# Patient Record
Sex: FEMALE | Race: OTHER | HISPANIC OR LATINO | URBAN - METROPOLITAN AREA
[De-identification: names, ages, dates, MRNs, and addresses within clinical notes are randomized per-mention and may not be internally consistent; named-entity substitution may affect disease eponyms.]

---

## 2022-04-14 ENCOUNTER — INPATIENT (INPATIENT)
Facility: HOSPITAL | Age: 62
LOS: 1 days | Discharge: HOME HEALTH | End: 2022-04-16
Attending: HOSPITALIST | Admitting: HOSPITALIST
Payer: MEDICAID

## 2022-04-14 VITALS
TEMPERATURE: 99 F | OXYGEN SATURATION: 98 % | SYSTOLIC BLOOD PRESSURE: 183 MMHG | WEIGHT: 130.07 LBS | DIASTOLIC BLOOD PRESSURE: 88 MMHG | HEART RATE: 69 BPM | RESPIRATION RATE: 18 BRPM

## 2022-04-14 LAB
ALBUMIN SERPL ELPH-MCNC: 3.6 G/DL — SIGNIFICANT CHANGE UP (ref 3.5–5.2)
ALP SERPL-CCNC: 87 U/L — SIGNIFICANT CHANGE UP (ref 30–115)
ALT FLD-CCNC: 8 U/L — SIGNIFICANT CHANGE UP (ref 0–41)
ANION GAP SERPL CALC-SCNC: 12 MMOL/L — SIGNIFICANT CHANGE UP (ref 7–14)
ANION GAP SERPL CALC-SCNC: 13 MMOL/L — SIGNIFICANT CHANGE UP (ref 7–14)
APPEARANCE UR: CLEAR — SIGNIFICANT CHANGE UP
AST SERPL-CCNC: 15 U/L — SIGNIFICANT CHANGE UP (ref 0–41)
BACTERIA # UR AUTO: NEGATIVE — SIGNIFICANT CHANGE UP
BASE EXCESS BLDV CALC-SCNC: -6.8 MMOL/L — LOW (ref -2–3)
BASOPHILS # BLD AUTO: 0.03 K/UL — SIGNIFICANT CHANGE UP (ref 0–0.2)
BASOPHILS NFR BLD AUTO: 0.3 % — SIGNIFICANT CHANGE UP (ref 0–1)
BILIRUB SERPL-MCNC: <0.2 MG/DL — SIGNIFICANT CHANGE UP (ref 0.2–1.2)
BILIRUB UR-MCNC: NEGATIVE — SIGNIFICANT CHANGE UP
BUN SERPL-MCNC: 42 MG/DL — HIGH (ref 10–20)
BUN SERPL-MCNC: 43 MG/DL — HIGH (ref 10–20)
CA-I SERPL-SCNC: 1.19 MMOL/L — SIGNIFICANT CHANGE UP (ref 1.15–1.33)
CALCIUM SERPL-MCNC: 8.5 MG/DL — SIGNIFICANT CHANGE UP (ref 8.5–10.1)
CALCIUM SERPL-MCNC: 8.8 MG/DL — SIGNIFICANT CHANGE UP (ref 8.5–10.1)
CHLORIDE SERPL-SCNC: 105 MMOL/L — SIGNIFICANT CHANGE UP (ref 98–110)
CHLORIDE SERPL-SCNC: 107 MMOL/L — SIGNIFICANT CHANGE UP (ref 98–110)
CO2 SERPL-SCNC: 18 MMOL/L — SIGNIFICANT CHANGE UP (ref 17–32)
CO2 SERPL-SCNC: 19 MMOL/L — SIGNIFICANT CHANGE UP (ref 17–32)
COLOR SPEC: COLORLESS — SIGNIFICANT CHANGE UP
CREAT SERPL-MCNC: 2.5 MG/DL — HIGH (ref 0.7–1.5)
CREAT SERPL-MCNC: 2.7 MG/DL — HIGH (ref 0.7–1.5)
DIFF PNL FLD: ABNORMAL
EGFR: 19 ML/MIN/1.73M2 — LOW
EGFR: 21 ML/MIN/1.73M2 — LOW
EOSINOPHIL # BLD AUTO: 0.1 K/UL — SIGNIFICANT CHANGE UP (ref 0–0.7)
EOSINOPHIL NFR BLD AUTO: 1 % — SIGNIFICANT CHANGE UP (ref 0–8)
EPI CELLS # UR: 2 /HPF — SIGNIFICANT CHANGE UP (ref 0–5)
GAS PNL BLDV: 133 MMOL/L — LOW (ref 136–145)
GAS PNL BLDV: SIGNIFICANT CHANGE UP
GLUCOSE SERPL-MCNC: 115 MG/DL — HIGH (ref 70–99)
GLUCOSE SERPL-MCNC: 140 MG/DL — HIGH (ref 70–99)
GLUCOSE UR QL: NEGATIVE — SIGNIFICANT CHANGE UP
HCO3 BLDV-SCNC: 20 MMOL/L — LOW (ref 22–29)
HCT VFR BLD CALC: 29 % — LOW (ref 37–47)
HCT VFR BLDA CALC: 30 % — LOW (ref 39–51)
HGB BLD CALC-MCNC: 10.1 G/DL — LOW (ref 12.6–17.4)
HGB BLD-MCNC: 9.8 G/DL — LOW (ref 12–16)
HYALINE CASTS # UR AUTO: 1 /LPF — SIGNIFICANT CHANGE UP (ref 0–7)
IMM GRANULOCYTES NFR BLD AUTO: 0.3 % — SIGNIFICANT CHANGE UP (ref 0.1–0.3)
KETONES UR-MCNC: NEGATIVE — SIGNIFICANT CHANGE UP
LACTATE BLDV-MCNC: 1.5 MMOL/L — SIGNIFICANT CHANGE UP (ref 0.5–2)
LEUKOCYTE ESTERASE UR-ACNC: NEGATIVE — SIGNIFICANT CHANGE UP
LYMPHOCYTES # BLD AUTO: 2.31 K/UL — SIGNIFICANT CHANGE UP (ref 1.2–3.4)
LYMPHOCYTES # BLD AUTO: 23.9 % — SIGNIFICANT CHANGE UP (ref 20.5–51.1)
MCHC RBC-ENTMCNC: 30.4 PG — SIGNIFICANT CHANGE UP (ref 27–31)
MCHC RBC-ENTMCNC: 33.8 G/DL — SIGNIFICANT CHANGE UP (ref 32–37)
MCV RBC AUTO: 90.1 FL — SIGNIFICANT CHANGE UP (ref 81–99)
MONOCYTES # BLD AUTO: 0.49 K/UL — SIGNIFICANT CHANGE UP (ref 0.1–0.6)
MONOCYTES NFR BLD AUTO: 5.1 % — SIGNIFICANT CHANGE UP (ref 1.7–9.3)
NEUTROPHILS # BLD AUTO: 6.7 K/UL — HIGH (ref 1.4–6.5)
NEUTROPHILS NFR BLD AUTO: 69.4 % — SIGNIFICANT CHANGE UP (ref 42.2–75.2)
NITRITE UR-MCNC: NEGATIVE — SIGNIFICANT CHANGE UP
NRBC # BLD: 0 /100 WBCS — SIGNIFICANT CHANGE UP (ref 0–0)
OSMOLALITY UR: 203 MOS/KG — SIGNIFICANT CHANGE UP (ref 50–1200)
PCO2 BLDV: 46 MMHG — HIGH (ref 39–42)
PH BLDV: 7.25 — LOW (ref 7.32–7.43)
PH UR: 6 — SIGNIFICANT CHANGE UP (ref 5–8)
PLATELET # BLD AUTO: 227 K/UL — SIGNIFICANT CHANGE UP (ref 130–400)
PO2 BLDV: 37 MMHG — SIGNIFICANT CHANGE UP
POTASSIUM BLDV-SCNC: 4.3 MMOL/L — SIGNIFICANT CHANGE UP (ref 3.5–5.1)
POTASSIUM SERPL-MCNC: 4.5 MMOL/L — SIGNIFICANT CHANGE UP (ref 3.5–5)
POTASSIUM SERPL-MCNC: 5.9 MMOL/L — HIGH (ref 3.5–5)
POTASSIUM SERPL-SCNC: 4.5 MMOL/L — SIGNIFICANT CHANGE UP (ref 3.5–5)
POTASSIUM SERPL-SCNC: 5.9 MMOL/L — HIGH (ref 3.5–5)
PROT SERPL-MCNC: 5.9 G/DL — LOW (ref 6–8)
PROT UR-MCNC: ABNORMAL
RBC # BLD: 3.22 M/UL — LOW (ref 4.2–5.4)
RBC # FLD: 12.6 % — SIGNIFICANT CHANGE UP (ref 11.5–14.5)
RBC CASTS # UR COMP ASSIST: 3 /HPF — SIGNIFICANT CHANGE UP (ref 0–4)
SAO2 % BLDV: 60.9 % — SIGNIFICANT CHANGE UP
SARS-COV-2 RNA SPEC QL NAA+PROBE: DETECTED
SODIUM SERPL-SCNC: 137 MMOL/L — SIGNIFICANT CHANGE UP (ref 135–146)
SODIUM SERPL-SCNC: 137 MMOL/L — SIGNIFICANT CHANGE UP (ref 135–146)
SODIUM UR-SCNC: 57 MMOL/L — SIGNIFICANT CHANGE UP
SP GR SPEC: 1 — LOW (ref 1.01–1.03)
UROBILINOGEN FLD QL: SIGNIFICANT CHANGE UP
WBC # BLD: 9.66 K/UL — SIGNIFICANT CHANGE UP (ref 4.8–10.8)
WBC # FLD AUTO: 9.66 K/UL — SIGNIFICANT CHANGE UP (ref 4.8–10.8)
WBC UR QL: 2 /HPF — SIGNIFICANT CHANGE UP (ref 0–5)

## 2022-04-14 PROCEDURE — 99223 1ST HOSP IP/OBS HIGH 75: CPT

## 2022-04-14 PROCEDURE — 93010 ELECTROCARDIOGRAM REPORT: CPT

## 2022-04-14 PROCEDURE — 76770 US EXAM ABDO BACK WALL COMP: CPT | Mod: 26

## 2022-04-14 PROCEDURE — 99285 EMERGENCY DEPT VISIT HI MDM: CPT

## 2022-04-14 RX ORDER — NIFEDIPINE 30 MG
30 TABLET, EXTENDED RELEASE 24 HR ORAL ONCE
Refills: 0 | Status: COMPLETED | OUTPATIENT
Start: 2022-04-14 | End: 2022-04-14

## 2022-04-14 RX ORDER — HEPARIN SODIUM 5000 [USP'U]/ML
5000 INJECTION INTRAVENOUS; SUBCUTANEOUS EVERY 12 HOURS
Refills: 0 | Status: DISCONTINUED | OUTPATIENT
Start: 2022-04-14 | End: 2022-04-16

## 2022-04-14 RX ORDER — SODIUM CHLORIDE 9 MG/ML
1000 INJECTION, SOLUTION INTRAVENOUS ONCE
Refills: 0 | Status: DISCONTINUED | OUTPATIENT
Start: 2022-04-14 | End: 2022-04-14

## 2022-04-14 RX ORDER — SODIUM CHLORIDE 9 MG/ML
1000 INJECTION, SOLUTION INTRAVENOUS
Refills: 0 | Status: DISCONTINUED | OUTPATIENT
Start: 2022-04-14 | End: 2022-04-16

## 2022-04-14 RX ORDER — SODIUM CHLORIDE 9 MG/ML
1000 INJECTION, SOLUTION INTRAVENOUS ONCE
Refills: 0 | Status: COMPLETED | OUTPATIENT
Start: 2022-04-14 | End: 2022-04-14

## 2022-04-14 RX ORDER — SODIUM BICARBONATE 1 MEQ/ML
650 SYRINGE (ML) INTRAVENOUS EVERY 8 HOURS
Refills: 0 | Status: DISCONTINUED | OUTPATIENT
Start: 2022-04-14 | End: 2022-04-16

## 2022-04-14 RX ORDER — NIFEDIPINE 30 MG
30 TABLET, EXTENDED RELEASE 24 HR ORAL DAILY
Refills: 0 | Status: DISCONTINUED | OUTPATIENT
Start: 2022-04-14 | End: 2022-04-16

## 2022-04-14 RX ORDER — ACETAMINOPHEN 500 MG
650 TABLET ORAL EVERY 6 HOURS
Refills: 0 | Status: DISCONTINUED | OUTPATIENT
Start: 2022-04-14 | End: 2022-04-16

## 2022-04-14 RX ADMIN — SODIUM CHLORIDE 1000 MILLILITER(S): 9 INJECTION, SOLUTION INTRAVENOUS at 14:19

## 2022-04-14 RX ADMIN — SODIUM CHLORIDE 75 MILLILITER(S): 9 INJECTION, SOLUTION INTRAVENOUS at 20:40

## 2022-04-14 RX ADMIN — Medication 30 MILLIGRAM(S): at 14:22

## 2022-04-14 RX ADMIN — Medication 650 MILLIGRAM(S): at 22:32

## 2022-04-14 NOTE — H&P ADULT - ASSESSMENT
62y F no pmhx went to see PMD and got routine blood work done Tuesday and  Was called by PMD today to come to the ER because her potassium was found to be elevated on blood work.    #JOSUE vs CKD progression  - Unable to tell if patient has underlying CKD due to poor follow up   - s/p 2L LR bolus in ED  - Will repeat CMP 8 pm  - avoid nephrotoxic meds  - Nephrology consulted  - start gentle hydration and monitor Cr  - send urine lytes    #Normocytic anemia   - likely 2/2 to kidney failure  - denies active bleeding  - monitor Hb  - f/u iron studies    #DVT ppx: scds  #GI ppx: not indicated  #Diet: DASH  #Dispo: acute 62y F no pmhx went to see PMD and got routine blood work done Tuesday and  Was called by PMD today to come to the ER because her potassium was found to be elevated on blood work.    #JOSUE vs CKD progression  - Unable to tell if patient has underlying CKD due to poor follow up   - s/p 2L LR bolus in ED  - Will repeat CMP 8 pm  - avoid nephrotoxic meds  - Nephrology consulted  - start gentle hydration and monitor Cr  - send urine lytes    #Normocytic anemia   - likely 2/2 to kidney failure  - denies active bleeding  - monitor Hb  - f/u iron studies    #Covid positive- asymptomatic  - monitor for now    #DVT ppx: heparin  #GI ppx: not indicated  #Diet: DASH  #Dispo: acute

## 2022-04-14 NOTE — CONSULT NOTE ADULT - ASSESSMENT
Pt without known PMH (not seen a doctor), recent visit with PMD revealed HTN, advanced renal failure, and severe proteinuria with normal HbA1C.    JOSUE vs CKD - the presence of proteinuria 300 with blood and RBC in urine,   possibly new HTN - require r/o Acute GN or RPGN  - kidney sono revealed smaller size kidney 8 and 9 cm c/w rather CKD  -regardless pt needs full work up for GN - ALBERTO ANCA DNA  c3c4 hepatits B and C, APL2R Ab SPEP UPEP SIF UIF FLC   -obtain UA, SPC ratio     MEt acidosis - due to CKD  start na bicarb 650 po tid  check phos, iPTH, CPK  strict Is and Os    HTN - start Nifedipine 30 mg qd  -obtain RAD to r/o ADRIANNE  Cholesterol as OP was 270  check EKG for LVH and CXR     will follow

## 2022-04-14 NOTE — ED PROVIDER NOTE - CARE PLAN
Principal Discharge DX:	JOSUE (acute kidney injury)   1 Principal Discharge DX:	JOSUE (acute kidney injury)  Assessment and plan of treatment:	Plan: EKG, labs, reassess  Secondary Diagnosis:	Anemia

## 2022-04-14 NOTE — ED ADULT NURSE NOTE - OBJECTIVE STATEMENT
Pt sent in by primary care physician for elevated potassium in blood work done in office. Pt reports no symptoms at this time. No CP, SOB, dizziness. Pt states she feels completely normal.

## 2022-04-14 NOTE — CONSULT NOTE ADULT - SUBJECTIVE AND OBJECTIVE BOX
NEPHROLOGY CONSULTATION NOTE    Patient is a 62y Female whom presented to the hospital with Jimmy and hyperkalemia.    Pt was recently seen by a PMD (she does not like going to doctors). She has HTN and recently started on a medication.  LABS from [facility] 4/13/22 revealed - creat 2.76 eGFr 18 cc/min K 6.2  Ca 8.3 Hb A1C 5.3% ; 25 vit D - 7.5  PROTEIURIA 300 blood+ RBC 6-10    Pt denies bubbles in the urine (does not look), no LE edema, no NSAids use, no flu like sx.    PAST MEDICAL & SURGICAL HISTORY:    Allergies:  No Known Allergies    Home Medications Reviewed  Hospital Medications:   MEDICATIONS  (STANDING):  sodium bicarbonate 650 milliGRAM(s) Oral every 8 hours      SOCIAL HISTORY:  Denies ETOH,Smoking,   FAMILY HISTORY:        REVIEW OF SYSTEMS:  CONSTITUTIONAL: No weakness, fevers or chills  EYES/ENT: No visual changes;  No vertigo or throat pain   NECK: No pain or stiffness  RESPIRATORY: No cough, wheezing, hemoptysis; No shortness of breath  CARDIOVASCULAR: No chest pain or palpitations.  GASTROINTESTINAL: No abdominal or epigastric pain. No nausea, vomiting, or hematemesis; No diarrhea or constipation. No melena or hematochezia.  GENITOURINARY: No dysuria, frequency, foamy urine, urinary urgency, incontinence or hematuria  NEUROLOGICAL: No numbness or weakness  SKIN: No itching, burning, rashes, or lesions   VASCULAR: No bilateral lower extremity edema.   All other review of systems is negative unless indicated above.    VITALS:  T(F): 98.8 (04-14-22 @ 12:09), Max: 98.8 (04-14-22 @ 12:09)  HR: 69 (04-14-22 @ 12:09)  BP: 183/88 (04-14-22 @ 12:09)  RR: 18 (04-14-22 @ 12:09)  SpO2: 98% (04-14-22 @ 12:09)      Weight (kg): 59 (04-14 @ 12:09)    I&O's Detail        PHYSICAL EXAM:  Constitutional: NAD  HEENT: anicteric sclera, oropharynx clear, MMM  Neck: No JVD  Respiratory: CTAB, no wheezes, rales or rhonchi  Cardiovascular: S1, S2, RRR  Gastrointestinal: BS+, soft, NT/ND  Extremities: No peripheral edema  Neurological: A/O x 3, no focal deficits  Psychiatric: Normal mood, normal affect  : No CVA tenderness. No lane.   Skin: No rashes  Vascular Access:    LABS:  04-14    137  |  105  |  42<H>  ----------------------------<  140<H>  4.5   |  19  |  2.7<H>    Ca    8.5      14 Apr 2022 12:45      Creatinine Trend: 2.7 <--                        9.8    9.66  )-----------( 227      ( 14 Apr 2022 12:45 )             29.0     Urine Studies:              RADIOLOGY & ADDITIONAL STUDIES:  < from: US Kidney and Bladder (04.14.22 @ 15:01) >    FINDINGS:    Right kidney: 8.0 cm. No hydronephrosis or calculi.    Left kidney:  9.1 cm. No hydronephrosis or calculi. Exophytic 0.7 cm cyst.    Urinary bladder: No debris or calculus. Bilateral ureteral jets are   visualized. Prevoid volume of approximately 345 cc. No significant   postvoid residual.      IMPRESSION:    Normal renal and bladder ultrasound.    < end of copied text >

## 2022-04-14 NOTE — H&P ADULT - NSHPPHYSICALEXAM_GEN_ALL_CORE
GENERAL: NAD, speaks in full sentences, no signs of respiratory distress  HEAD:  Atraumatic, Normocephalic  EYES: EOMI, PERRLA, anicteric sclera  NECK: Supple, No JVD  CHEST/LUNG: Clear to auscultation bilaterally; No wheeze; No crackles; No accessory muscles used  HEART: Regular rate and rhythm; No murmurs;   ABDOMEN: Soft, Nontender, Nondistended; Bowel sounds present; No guarding  EXTREMITIES:  2+ Peripheral Pulses, No cyanosis or edema  PSYCH: AAOx3  NEUROLOGY: non-focal  SKIN: No rashes or lesions GENERAL: NAD, speaks in full sentences, no signs of respiratory distress  HEAD:  Atraumatic, Normocephalic  EYES: EOMI, PERRLA, anicteric sclera  NECK: Supple  CHEST/LUNG: Clear to auscultation bilaterally;   HEART: Regular rate and rhythm; No murmurs;   ABDOMEN: Soft, Nontender, Nondistended; Bowel sounds present; No guarding  EXTREMITIES: , No cyanosis or edema  PSYCH: AAOx3  NEUROLOGY: non-focal  SKIN: No rashes or lesions

## 2022-04-14 NOTE — ED PROVIDER NOTE - PROGRESS NOTE DETAILS
BUN/Cr/GFR acknowledges. Pt says she does not regularly follow up with PMD. No known hx of HTN, T2D, CKD. Pt not endorsing urinary/abdominal/ complaints at this time. Says she was told today that her potassium was high and her kidney numbers were getting high. Pt says this is the first time she is hearing of herself having kidney problems -clement ED Attending MARTIN Mckeon  Patient and daughter aware of all results including hemoglobin of 9.8, no melena or bright red blood per rectum not on anticoagulation.  BUN over creatinine 42/2.7.  Report patient has no known history of kidney issues but was not following up with her PMD.  Understand plan for admission.  Medical admitting team aware of patient admission.

## 2022-04-14 NOTE — ED PROVIDER NOTE - ATTENDING CONTRIBUTION TO CARE
62-year-old female with no past medical history but daughter at bedside was translating for patient as preferred by patient reports she does not really follow-up with her PMD presents for abnormal routine blood work that she obtained approximately 4 days ago on Monday with her PMD.  She had a phone call today and was told to come to the ED for hyperkalemia, and was also told that she is anemic.  Patient reports 3 loose stools today and upper respiratory symptoms.  No fever, chills, n/v, cp, sob, pleuritic cp, palpitations, diaphoresis, cough, neck pain/stiffness, abd pain, constipation, urinary symptoms, trauma, ha/lh/dizziness, numbness/tingling, sick contacts, recent travel, or rash.     on exam: WDWN appearing male/female sitting on stretcher in nad, speaking full sentences, no rash, mmm, no erythema, exudates, or petechiae,  (+) Rhinorrhea, RRR, radial pulses 2/4 b/l, no jvd, ctabl w/ breath sounds present b/l, no wheezing or crackles, no accessory muscle use, no tachypnea, no stridor, bs present throughout all 4 quadrants, abd soft, nd, nt, no rebound tenderness or guarding, no cvatFROM of ext, no edema, no calf pain/swelling/erythema, AAOx3.  motor 5/5 and sensation intact throughout upper and lower ext. No focal deficits.

## 2022-04-14 NOTE — H&P ADULT - NSHPLABSRESULTS_GEN_ALL_CORE
9.8    9.66  )-----------( 227      ( 14 Apr 2022 12:45 )             29.0       04-14    137  |  105  |  42<H>  ----------------------------<  140<H>  4.5   |  19  |  2.7<H>    Ca    8.5      14 Apr 2022 12:45                        Lactate Trend            CAPILLARY BLOOD GLUCOSE

## 2022-04-14 NOTE — PATIENT PROFILE ADULT - VISION (WITH CORRECTIVE LENSES IF THE PATIENT USUALLY WEARS THEM):
Left eye blurred, right eye mild redness noted./Partially impaired: cannot see medication labels or newsprint, but can see obstacles in path, and the surrounding layout; can count fingers at arm's length

## 2022-04-14 NOTE — PATIENT PROFILE ADULT - FALL HARM RISK - UNIVERSAL INTERVENTIONS
Bed in lowest position, wheels locked, appropriate side rails in place/Call bell, personal items and telephone in reach/Instruct patient to call for assistance before getting out of bed or chair/Non-slip footwear when patient is out of bed/[place] to call system/Physically safe environment - no spills, clutter or unnecessary equipment/Purposeful Proactive Rounding/Room/bathroom lighting operational, light cord in reach

## 2022-04-14 NOTE — ED ADULT TRIAGE NOTE - CHIEF COMPLAINT QUOTE
Patient went to her PMD and had routine blood work done which showed for high potassium- patient has no symptoms at this time and is only here due to her PMD referring her.

## 2022-04-14 NOTE — ED PROVIDER NOTE - NS ED ROS FT
Review of Systems:  •	CONSTITUTIONAL - No fever, No diaphoresis, No weight change  •	SKIN - No rash  •	HEMATOLOGIC - No abnormal bleeding or bruising  •	EYES - No eye pain, No blurred vision  •	ENT - No change in hearing, No sore throat, No neck pain, No rhinorrhea, No ear pain  •	RESPIRATORY - No shortness of breath, No cough  •	CARDIAC -No chest pain, No palpitations  •	GI - see HPI  •                 - No dysuria, frequency, hematuria.   •	ENDO - No polydypsia, No polyuria, No heat/cold intolerance  •	MUSCULOSKELETAL - No joint paint, No swelling, No back pain  •	NEUROLOGIC - No numbness, No focal weakness, No headache, No dizziness  All other systems negative, unless specified in HPI

## 2022-04-14 NOTE — H&P ADULT - ATTENDING COMMENTS
Patient seen and examined on 04/14/2022 at 21:25    HPI: translation provided by her daughter Mary (at the patients request)  61 y/o Wolof speaking woman with no previous past medical history admitted for JOSUE vs CKD. She was referred to ED after outpatient routine lab work noted hyperkalemia (normal initially and subsequently increased to 5.9 on repeat). She doesn't typically follow with doctors. Found to have significantly reduced renal function, unclear if this is new or chronic. She feels in her normal stated of health without any complaints. No changes in urinary output  Noted to be COVID positive, had a family member who tested positive but she reports no symptoms at this time.  Seen by nephro, started on Bicarb PO and CKD workup requested.     REVIEW OF SYSTEMS:  CONSTITUTIONAL:  No weakness, fevers, chills, night sweats, weight loss  EYES/ENT: No visual changes. No vertigo or dysphagia  NECK: No neck pain or stiffness  RESPIRATORY: No cough, wheezing, hemoptysis. No shortness of breath  CARDIOVASCULAR: No chest pain or palpitations. No lower extremity edema  GASTROINTESTINAL: No abdominal pain. No nausea, vomiting, diarrhea, or hematemesis  GENITOURINARY: No dysuria or hematuria   NEUROLOGICAL: No focal numbness or weakness  SKIN: No rashes or itching  HEMATOLOGIC: No easy bruising or prolonged bleeding.      PHYSICAL EXAM:  GENERAL: NAD, well-developed, Non-toxic, stated age   HEAD:  Atraumatic, Normocephalic  EYES: EOMI, Sclera White   NECK: Supple, No JVD  CHEST/LUNG: Clear to auscultation bilaterally; No wheezing, rhonchi, or crackles  HEART: Regular rate and rhythm; s1, s2, No murmurs, rubs, or gallops  ABDOMEN: Soft, Nontender, Nondistended; Bowel sounds present, No rebound or guarding noted   EXTREMITIES:  No lower extremity edema or calf tenderness to palpation.  No clubbing or cyanosis  PSYCH: AAOx3, pleasant, cooperative, not anxious  NEUROLOGY: 5/5 strength in all extremities, no downward drift. Sensation grossly intact.   SKIN: No rashes or lesions      ASSESSMENT AND PLAN:  JOSUE vs CKD IV   Metabolic acidosis   Hyperkalemia  -Lokelma 10mg BID for now, repeat AM potassium    -Nephro following  -CKD work up as above, per nephro   -Trend I/O  -Avoid nephro toxic medications     Normocytic anemia --> no clinical evidence of bleeding  -Check iron studies     HTN: cont with nifedipine PO  -Check hba1c and lipids     DVT ppx: Lovenox/Heparin  GI ppx: Not indicated  GOC: Full code.    My note supersedes the residents in the event of a discrepancy. Patient seen and examined on 04/14/2022 at 21:25    HPI: translation provided by her daughter Mary (at the patients request)  61 y/o [place]ian speaking woman with no previous past medical history admitted for JOSUE vs CKD. She was referred to ED after outpatient routine lab work noted hyperkalemia (normal initially and subsequently increased to 5.9 on repeat). She doesn't typically follow with doctors. Found to have significantly reduced renal function, unclear if this is new or chronic. She feels in her normal stated of health without any complaints. No changes in urinary output  Noted to be COVID positive, had a family member who tested positive but she reports no symptoms at this time.  Seen by nephro, started on Bicarb PO and CKD workup requested.     REVIEW OF SYSTEMS:  CONSTITUTIONAL:  No weakness, fevers, chills, night sweats, weight loss  EYES/ENT: No visual changes. No vertigo or dysphagia  NECK: No neck pain or stiffness  RESPIRATORY: No cough, wheezing, hemoptysis. No shortness of breath  CARDIOVASCULAR: No chest pain or palpitations. No lower extremity edema  GASTROINTESTINAL: No abdominal pain. No nausea, vomiting, diarrhea, or hematemesis  GENITOURINARY: No dysuria or hematuria   NEUROLOGICAL: No focal numbness or weakness  SKIN: No rashes or itching  HEMATOLOGIC: No easy bruising or prolonged bleeding.      PHYSICAL EXAM:  GENERAL: NAD, well-developed, Non-toxic, stated age   HEAD:  Atraumatic, Normocephalic  EYES: EOMI, Sclera White   NECK: Supple, No JVD  CHEST/LUNG: Clear to auscultation bilaterally; No wheezing, rhonchi, or crackles  HEART: Regular rate and rhythm; s1, s2, No murmurs, rubs, or gallops  ABDOMEN: Soft, Nontender, Nondistended; Bowel sounds present, No rebound or guarding noted   EXTREMITIES:  No lower extremity edema or calf tenderness to palpation.  No clubbing or cyanosis  PSYCH: AAOx3, pleasant, cooperative, not anxious  NEUROLOGY: 5/5 strength in all extremities, no downward drift. Sensation grossly intact.   SKIN: No rashes or lesions      ASSESSMENT AND PLAN:  JOSUE vs CKD IV   Metabolic acidosis   Hyperkalemia  -Lokelma 10mg BID for now, repeat AM potassium    -Nephro following  -CKD work up as above, per nephro   -Trend I/O  -Avoid nephro toxic medications     Normocytic anemia --> no clinical evidence of bleeding  -Check iron studies     HTN: cont with nifedipine PO  -Check hba1c and lipids     COVID: asymptomatic  -monitor clinically, no acute interventions at this time     DVT ppx: Lovenox/Heparin  GI ppx: Not indicated  GOC: Full code.    My note supersedes the residents in the event of a discrepancy.

## 2022-04-14 NOTE — ED PROVIDER NOTE - OBJECTIVE STATEMENT
62y F no pmh went to see PMD and got routine blood work done Tuesday. Was called by PMD today to come to the ER because her potassium was found to be elevated on blood work. Pt endorsing 3 episodes of loose stools today. Otherwise pt has no other medical complaints at this time. Pt does not take any medications. No f/c. No chest pain/sob/weakness. No changes in diet. No abdominal pain.

## 2022-04-15 LAB
A1C WITH ESTIMATED AVERAGE GLUCOSE RESULT: 5.6 % — SIGNIFICANT CHANGE UP (ref 4–5.6)
ALBUMIN SERPL ELPH-MCNC: 3.3 G/DL — LOW (ref 3.5–5.2)
ALP SERPL-CCNC: 81 U/L — SIGNIFICANT CHANGE UP (ref 30–115)
ALT FLD-CCNC: 6 U/L — SIGNIFICANT CHANGE UP (ref 0–41)
ANION GAP SERPL CALC-SCNC: 8 MMOL/L — SIGNIFICANT CHANGE UP (ref 7–14)
AST SERPL-CCNC: 12 U/L — SIGNIFICANT CHANGE UP (ref 0–41)
BASOPHILS # BLD AUTO: 0.02 K/UL — SIGNIFICANT CHANGE UP (ref 0–0.2)
BASOPHILS NFR BLD AUTO: 0.2 % — SIGNIFICANT CHANGE UP (ref 0–1)
BILIRUB SERPL-MCNC: <0.2 MG/DL — SIGNIFICANT CHANGE UP (ref 0.2–1.2)
BLD GP AB SCN SERPL QL: SIGNIFICANT CHANGE UP
BUN SERPL-MCNC: 40 MG/DL — HIGH (ref 10–20)
CALCIUM SERPL-MCNC: 8.7 MG/DL — SIGNIFICANT CHANGE UP (ref 8.4–10.5)
CALCIUM SERPL-MCNC: 8.8 MG/DL — SIGNIFICANT CHANGE UP (ref 8.5–10.1)
CHLORIDE SERPL-SCNC: 105 MMOL/L — SIGNIFICANT CHANGE UP (ref 98–110)
CHOLEST SERPL-MCNC: 238 MG/DL — HIGH
CK SERPL-CCNC: 41 U/L — SIGNIFICANT CHANGE UP (ref 0–225)
CO2 SERPL-SCNC: 21 MMOL/L — SIGNIFICANT CHANGE UP (ref 17–32)
CREAT ?TM UR-MCNC: 20 MG/DL — SIGNIFICANT CHANGE UP
CREAT SERPL-MCNC: 2.4 MG/DL — HIGH (ref 0.7–1.5)
EGFR: 22 ML/MIN/1.73M2 — LOW
EOSINOPHIL # BLD AUTO: 0.04 K/UL — SIGNIFICANT CHANGE UP (ref 0–0.7)
EOSINOPHIL NFR BLD AUTO: 0.4 % — SIGNIFICANT CHANGE UP (ref 0–8)
ESTIMATED AVERAGE GLUCOSE: 114 MG/DL — SIGNIFICANT CHANGE UP (ref 68–114)
FERRITIN SERPL-MCNC: 31 NG/ML — SIGNIFICANT CHANGE UP (ref 15–150)
GLUCOSE SERPL-MCNC: 91 MG/DL — SIGNIFICANT CHANGE UP (ref 70–99)
HCT VFR BLD CALC: 27.9 % — LOW (ref 37–47)
HCV AB S/CO SERPL IA: 0.04 COI — SIGNIFICANT CHANGE UP
HCV AB SERPL-IMP: SIGNIFICANT CHANGE UP
HDLC SERPL-MCNC: 52 MG/DL — SIGNIFICANT CHANGE UP
HGB BLD-MCNC: 9.7 G/DL — LOW (ref 12–16)
IMM GRANULOCYTES NFR BLD AUTO: 0.4 % — HIGH (ref 0.1–0.3)
LIPID PNL WITH DIRECT LDL SERPL: 140 MG/DL — HIGH
LYMPHOCYTES # BLD AUTO: 2.35 K/UL — SIGNIFICANT CHANGE UP (ref 1.2–3.4)
LYMPHOCYTES # BLD AUTO: 21.6 % — SIGNIFICANT CHANGE UP (ref 20.5–51.1)
MCHC RBC-ENTMCNC: 30.5 PG — SIGNIFICANT CHANGE UP (ref 27–31)
MCHC RBC-ENTMCNC: 34.8 G/DL — SIGNIFICANT CHANGE UP (ref 32–37)
MCV RBC AUTO: 87.7 FL — SIGNIFICANT CHANGE UP (ref 81–99)
MONOCYTES # BLD AUTO: 0.45 K/UL — SIGNIFICANT CHANGE UP (ref 0.1–0.6)
MONOCYTES NFR BLD AUTO: 4.1 % — SIGNIFICANT CHANGE UP (ref 1.7–9.3)
NEUTROPHILS # BLD AUTO: 7.99 K/UL — HIGH (ref 1.4–6.5)
NEUTROPHILS NFR BLD AUTO: 73.3 % — SIGNIFICANT CHANGE UP (ref 42.2–75.2)
NON HDL CHOLESTEROL: 186 MG/DL — HIGH
NRBC # BLD: 0 /100 WBCS — SIGNIFICANT CHANGE UP (ref 0–0)
PHOSPHATE SERPL-MCNC: 4.1 MG/DL — SIGNIFICANT CHANGE UP (ref 2.1–4.9)
PLATELET # BLD AUTO: 221 K/UL — SIGNIFICANT CHANGE UP (ref 130–400)
POTASSIUM SERPL-MCNC: 5 MMOL/L — SIGNIFICANT CHANGE UP (ref 3.5–5)
POTASSIUM SERPL-SCNC: 5 MMOL/L — SIGNIFICANT CHANGE UP (ref 3.5–5)
PROT ?TM UR-MCNC: 120 MG/DLG/24H — SIGNIFICANT CHANGE UP
PROT ?TM UR-MCNC: 128 MG/DL — HIGH (ref 0–12)
PROT SERPL-MCNC: 5.4 G/DL — LOW (ref 6–8)
PROT SERPL-MCNC: 5.6 G/DL — LOW (ref 6–8.3)
PROT SERPL-MCNC: 5.6 G/DL — LOW (ref 6–8.3)
PROT/CREAT UR-RTO: 6 RATIO — HIGH (ref 0–0.2)
PTH-INTACT FLD-MCNC: 54 PG/ML — SIGNIFICANT CHANGE UP (ref 15–65)
RBC # BLD: 3.18 M/UL — LOW (ref 4.2–5.4)
RBC # FLD: 12.4 % — SIGNIFICANT CHANGE UP (ref 11.5–14.5)
SODIUM SERPL-SCNC: 134 MMOL/L — LOW (ref 135–146)
TRIGL SERPL-MCNC: 233 MG/DL — HIGH
UUN UR-MCNC: 208 MG/DL — SIGNIFICANT CHANGE UP
WBC # BLD: 10.89 K/UL — HIGH (ref 4.8–10.8)
WBC # FLD AUTO: 10.89 K/UL — HIGH (ref 4.8–10.8)

## 2022-04-15 PROCEDURE — 99233 SBSQ HOSP IP/OBS HIGH 50: CPT

## 2022-04-15 RX ORDER — SIMVASTATIN 20 MG/1
10 TABLET, FILM COATED ORAL AT BEDTIME
Refills: 0 | Status: DISCONTINUED | OUTPATIENT
Start: 2022-04-15 | End: 2022-04-16

## 2022-04-15 RX ORDER — SODIUM ZIRCONIUM CYCLOSILICATE 10 G/10G
10 POWDER, FOR SUSPENSION ORAL
Refills: 0 | Status: DISCONTINUED | OUTPATIENT
Start: 2022-04-15 | End: 2022-04-16

## 2022-04-15 RX ADMIN — Medication 650 MILLIGRAM(S): at 13:20

## 2022-04-15 RX ADMIN — SODIUM CHLORIDE 75 MILLILITER(S): 9 INJECTION, SOLUTION INTRAVENOUS at 11:34

## 2022-04-15 RX ADMIN — Medication 650 MILLIGRAM(S): at 22:55

## 2022-04-15 RX ADMIN — SODIUM ZIRCONIUM CYCLOSILICATE 10 GRAM(S): 10 POWDER, FOR SUSPENSION ORAL at 17:53

## 2022-04-15 RX ADMIN — Medication 30 MILLIGRAM(S): at 06:12

## 2022-04-15 RX ADMIN — SIMVASTATIN 10 MILLIGRAM(S): 20 TABLET, FILM COATED ORAL at 22:55

## 2022-04-15 RX ADMIN — Medication 650 MILLIGRAM(S): at 06:12

## 2022-04-15 RX ADMIN — HEPARIN SODIUM 5000 UNIT(S): 5000 INJECTION INTRAVENOUS; SUBCUTANEOUS at 06:13

## 2022-04-15 RX ADMIN — HEPARIN SODIUM 5000 UNIT(S): 5000 INJECTION INTRAVENOUS; SUBCUTANEOUS at 17:52

## 2022-04-15 RX ADMIN — Medication 650 MILLIGRAM(S): at 12:20

## 2022-04-15 RX ADMIN — Medication 650 MILLIGRAM(S): at 12:50

## 2022-04-15 NOTE — DISCHARGE NOTE NURSING/CASE MANAGEMENT/SOCIAL WORK - PATIENT PORTAL LINK FT
You can access the FollowMyHealth Patient Portal offered by [facility] by registering at the following website: [email or web address]. By joining [facility]’s FollowMyHealth portal, you will also be able to view your health information using other applications (apps) compatible with our system.

## 2022-04-15 NOTE — DISCHARGE NOTE NURSING/CASE MANAGEMENT/SOCIAL WORK - NSDCPEFALRISK_GEN_ALL_CORE
For information on Fall & Injury Prevention, visit: [email or web address] OR  [email or web address] OR  https://www.cdc.gov/steadi/patient.html

## 2022-04-15 NOTE — PROGRESS NOTE ADULT - ASSESSMENT
Pt without known PMH (not seen a doctor), recent visit with PMD revealed HTN, advanced renal failure, and severe proteinuria with normal HbA1C.    JOSUE vs CKD - the presence of proteinuria 300 with blood and RBC in urine (on OP labs),   possibly new HTN - require r/o Acute GN or RPGN  - kidney sono revealed smaller size kidney 8 and 9 cm c/w rather CKD  -regardless pt needs full work up for GN - ALBERTO ANCA DNA  c3c4 hepatitis B and C, APL2R Ab SPEP UPEP SIF UIF FLC   - UA very low specific gravity 1005 and  protein 100,  - obtain SPC ratio   Hyperkalemia - please change diet to renal low K  -pt needs low K diet in [place] to be printed  - cont Lokelma 10 gr qd    MEt acidosis - due to CKD  start na bicarb 650 po tid  check phos, iPTH, CPK  strict Is and Os    HTN - start Nifedipine 30 mg qd  -obtain RAD to r/o ADRIANNE  Cholesterol as OP was 270   - would start a statin  check EKG for LVH and CXR     will follow

## 2022-04-16 VITALS
RESPIRATION RATE: 18 BRPM | SYSTOLIC BLOOD PRESSURE: 114 MMHG | TEMPERATURE: 97 F | HEART RATE: 55 BPM | OXYGEN SATURATION: 100 % | DIASTOLIC BLOOD PRESSURE: 84 MMHG

## 2022-04-16 LAB
ANION GAP SERPL CALC-SCNC: 8 MMOL/L — SIGNIFICANT CHANGE UP (ref 7–14)
BUN SERPL-MCNC: 50 MG/DL — HIGH (ref 10–20)
C3 SERPL-MCNC: 127 MG/DL — SIGNIFICANT CHANGE UP (ref 81–157)
C4 SERPL-MCNC: 31 MG/DL — SIGNIFICANT CHANGE UP (ref 13–39)
CALCIUM SERPL-MCNC: 8.3 MG/DL — LOW (ref 8.5–10.1)
CHLORIDE SERPL-SCNC: 109 MMOL/L — SIGNIFICANT CHANGE UP (ref 98–110)
CO2 SERPL-SCNC: 21 MMOL/L — SIGNIFICANT CHANGE UP (ref 17–32)
CREAT SERPL-MCNC: 2.6 MG/DL — HIGH (ref 0.7–1.5)
EGFR: 20 ML/MIN/1.73M2 — LOW
GLUCOSE SERPL-MCNC: 89 MG/DL — SIGNIFICANT CHANGE UP (ref 70–99)
HAV IGM SER-ACNC: SIGNIFICANT CHANGE UP
HBV CORE IGM SER-ACNC: SIGNIFICANT CHANGE UP
HBV SURFACE AG SER-ACNC: SIGNIFICANT CHANGE UP
HCT VFR BLD CALC: 26.2 % — LOW (ref 37–47)
HCV AB S/CO SERPL IA: 0.07 S/CO — SIGNIFICANT CHANGE UP (ref 0–0.99)
HCV AB SERPL-IMP: SIGNIFICANT CHANGE UP
HGB BLD-MCNC: 8.9 G/DL — LOW (ref 12–16)
KAPPA LC SER QL IFE: 6.54 MG/DL — HIGH (ref 0.33–1.94)
KAPPA/LAMBDA FREE LIGHT CHAIN RATIO, SERUM: 1.51 RATIO — SIGNIFICANT CHANGE UP (ref 0.26–1.65)
LAMBDA LC SER QL IFE: 4.34 MG/DL — HIGH (ref 0.57–2.63)
MAGNESIUM SERPL-MCNC: 1.8 MG/DL — SIGNIFICANT CHANGE UP (ref 1.8–2.4)
MCHC RBC-ENTMCNC: 30.2 PG — SIGNIFICANT CHANGE UP (ref 27–31)
MCHC RBC-ENTMCNC: 34 G/DL — SIGNIFICANT CHANGE UP (ref 32–37)
MCV RBC AUTO: 88.8 FL — SIGNIFICANT CHANGE UP (ref 81–99)
NRBC # BLD: 0 /100 WBCS — SIGNIFICANT CHANGE UP (ref 0–0)
PLATELET # BLD AUTO: 200 K/UL — SIGNIFICANT CHANGE UP (ref 130–400)
POTASSIUM SERPL-MCNC: 5.1 MMOL/L — HIGH (ref 3.5–5)
POTASSIUM SERPL-SCNC: 5.1 MMOL/L — HIGH (ref 3.5–5)
RBC # BLD: 2.95 M/UL — LOW (ref 4.2–5.4)
RBC # FLD: 12.5 % — SIGNIFICANT CHANGE UP (ref 11.5–14.5)
SODIUM SERPL-SCNC: 138 MMOL/L — SIGNIFICANT CHANGE UP (ref 135–146)
WBC # BLD: 7.37 K/UL — SIGNIFICANT CHANGE UP (ref 4.8–10.8)
WBC # FLD AUTO: 7.37 K/UL — SIGNIFICANT CHANGE UP (ref 4.8–10.8)

## 2022-04-16 PROCEDURE — 99239 HOSP IP/OBS DSCHRG MGMT >30: CPT

## 2022-04-16 RX ORDER — SIMVASTATIN 20 MG/1
1 TABLET, FILM COATED ORAL
Qty: 30 | Refills: 0
Start: 2022-04-16 | End: 2022-05-15

## 2022-04-16 RX ORDER — NIFEDIPINE 30 MG
1 TABLET, EXTENDED RELEASE 24 HR ORAL
Qty: 30 | Refills: 0
Start: 2022-04-16 | End: 2022-05-15

## 2022-04-16 RX ORDER — SODIUM ZIRCONIUM CYCLOSILICATE 10 G/10G
10 POWDER, FOR SUSPENSION ORAL
Qty: 140 | Refills: 0
Start: 2022-04-16 | End: 2022-04-29

## 2022-04-16 RX ORDER — SODIUM BICARBONATE 1 MEQ/ML
1 SYRINGE (ML) INTRAVENOUS
Qty: 90 | Refills: 0
Start: 2022-04-16 | End: 2022-05-15

## 2022-04-16 RX ORDER — SODIUM ZIRCONIUM CYCLOSILICATE 10 G/10G
10 POWDER, FOR SUSPENSION ORAL DAILY
Refills: 0 | Status: DISCONTINUED | OUTPATIENT
Start: 2022-04-16 | End: 2022-04-16

## 2022-04-16 RX ADMIN — Medication 650 MILLIGRAM(S): at 06:27

## 2022-04-16 RX ADMIN — Medication 650 MILLIGRAM(S): at 11:17

## 2022-04-16 RX ADMIN — HEPARIN SODIUM 5000 UNIT(S): 5000 INJECTION INTRAVENOUS; SUBCUTANEOUS at 06:27

## 2022-04-16 RX ADMIN — Medication 30 MILLIGRAM(S): at 06:28

## 2022-04-16 RX ADMIN — SODIUM ZIRCONIUM CYCLOSILICATE 10 GRAM(S): 10 POWDER, FOR SUSPENSION ORAL at 06:27

## 2022-04-16 NOTE — DISCHARGE NOTE PROVIDER - NSDCCPCAREPLAN_GEN_ALL_CORE_FT
PRINCIPAL DISCHARGE DIAGNOSIS  Diagnosis: HTN (hypertension)  Assessment and Plan of Treatment: You were newly diagnosed with hypertension, and started on blood pressure medication. Persistent elevated blood pressure can cause number of health condition, such as Heart disease, kidney diseae, and stroke. We recommend you follow up with kidney doctor and primary care doctoro within one week        SECONDARY DISCHARGE DIAGNOSES  Diagnosis: Anemia  Assessment and Plan of Treatment:     Diagnosis: Chronic kidney disease, unspecified CKD stage  Assessment and Plan of Treatment:     Diagnosis: JOSUE (acute kidney injury)  Assessment and Plan of Treatment:

## 2022-04-16 NOTE — PROGRESS NOTE ADULT - ASSESSMENT
Pt without known PMH (not seen a doctor), recent visit with PMD revealed HTN, advanced renal failure, and severe proteinuria with normal HbA1C.  JOSUE vs CKD - the presence of proteinuria 300 with blood and RBC in urine (on OP labs),   possibly new HTN - require r/o Acute GN or RPGN  - kidney sono revealed smaller size kidney 8 and 9 cm c/w rather CKD   work up for GN - ALBERTO ANCA DNA  c3c4 hepatitis B and C, APL2R Ab SPEP UPEP SIF UIF FLC   - UA very low specific gravity 1005 and  protein 100,  - obtain SPC ratio   Hyperkalemia - please change diet to renal low K/ continue lokelma   MEt acidosis - due to CKD  contine sodium bicarbonate   ph at goal  check iron stores    will follow

## 2022-04-16 NOTE — DISCHARGE NOTE PROVIDER - NSDCFUADDINST_GEN_ALL_CORE_FT
Please follow up with your primary care doctor within 1 week,   please follow up with Nephrology doctor within one week of discharge, information provided above. Please follow up with your primary care doctor within 1 week,   please follow up with Nephrology doctor within one week of discharge, information provided above.  please repeat BMP panel within one week of discharge

## 2022-04-16 NOTE — DISCHARGE NOTE PROVIDER - CARE PROVIDER_API CALL
Celia Graham)  Nephrology  [address] [place] [address]  Phone: [phone]  Fax: [phone]  Follow Up Time: 1 week

## 2022-04-16 NOTE — DISCHARGE NOTE PROVIDER - HOSPITAL COURSE
61 y/o Female with no pmhx was admitted for abnormal lab work done by PMD. Potassium elevated. In ER patient was diagnosed with JOSUE, undiagnosed CKD, and newly diagnosed HTN,  and HLD. Potassium levels improved while inpatient s/p IVF, Lokelma. Nephrology on board, work up done for CKD, initial work up showed evidence of Nephrotic syndrome. Rest of the of work up is pending, patient advised to follow up outpatient with nephrology doctor.  In ER she is also Covid positive, but asymptomatic, sp 5 days of quarantine.   Newly diagnosed HTN, patient started on low salt diet and being discharged on Procardia XL 30mg QD

## 2022-04-16 NOTE — PROGRESS NOTE ADULT - SUBJECTIVE AND OBJECTIVE BOX
MAHMOOD NARESH  [facility] T2[facility] 029 [facility]            Patient was evaluated and examined  by bedside, no active complains          REVIEW OF SYSTEMS:  please see pertinent positives mentioned above, all other 12 ROS negative      T(C): , Max: 36.3 (04-15-22 @ 19:58)  HR: 55 (04-16-22 @ 05:00)  BP: 114/84 (04-16-22 @ 05:00)  RR: 18 (04-16-22 @ 05:00)  SpO2: 100% (04-16-22 @ 05:00)  CAPILLARY BLOOD GLUCOSE          PHYSICAL EXAM:  General: NAD, AAOX3, patient is laying comfortably in bed  HEENT: AT, NC, Supple, NO JVD, NO CB  Lungs: CTA B/L, no wheezing, no rhonchi  CVS: normal S1, S2, RRR, NO M/G/R  Abdomen: soft, bowel sounds present, non-tender, non-distended  Extremities: no edema, no clubbing, no cyanosis, positive peripheral pulses b/l  Neuro: no acute focal neurological deficits  Skin: no rash, no ecchymosis      LAB  CBC  Date: 04-16-22 @ 04:30  Mean cell Fqojatbzxq18.2  Mean cell Hemoglobin Conc34.0  Mean cell Volum 88.8  Platelet count-Automate 200  RBC Count 2.95  Red Cell Distrib Width12.5  WBC Count7.37  % Albumin, Urine--  Hematocrit 26.2  Hemoglobin 8.9  CBC  Date: 04-15-22 @ 04:30  Mean cell Fecwjaecdw92.5  Mean cell Hemoglobin Conc34.8  Mean cell Volum 87.7  Platelet count-Automate 221  RBC Count 3.18  Red Cell Distrib Width12.4  WBC Count10.89  % Albumin, Urine--  Hematocrit 27.9  Hemoglobin 9.7  CBC  Date: 04-14-22 @ 12:45  Mean cell Shkvrinvlr25.4  Mean cell Hemoglobin Conc33.8  Mean cell Volum 90.1  Platelet count-Automate 227  RBC Count 3.22  Red Cell Distrib Width12.6  WBC Count9.66  % Albumin, Urine--  Hematocrit 29.0  Hemoglobin 9.8    BMP  04-16-22 @ 04:30  Blood Gas Arterial-Calcium,Ionized--  Blood Urea Nitrogen, Serum 50 mg/dL<H> [10 - 20]  Carbon Dioxide, Serum21 mmol/L [17 - 32]  Chloride, Vxqgn080 mmol/L [98 - 110]  Creatinie, Serum2.6 mg/dL<H> [0.7 - 1.5]  Glucose, Serum89 mg/dL [70 - 99]  Potassium, Serum5.1 mmol/L<H> [3.5 - 5.0]  Sodium, Serum 138 mmol/L [135 - 146]  [facility]  04-15-22 @ 04:30  Blood Gas Arterial-Calcium,Ionized--  Blood Urea Nitrogen, Serum 40 mg/dL<H> [10 - 20]  Carbon Dioxide, Serum21 mmol/L [17 - 32]  Chloride, Ptixh504 mmol/L [98 - 110]  Creatinie, Serum2.4 mg/dL<H> [0.7 - 1.5]  Glucose, Serum91 mg/dL [70 - 99]  Potassium, Serum5.0 mmol/L [3.5 - 5.0]  Sodium, Serum 134 mmol/L<L> [135 - 146]  [facility]  04-14-22 @ 20:00  Blood Gas Arterial-Calcium,Ionized--  Blood Urea Nitrogen, Serum 43 mg/dL<H> [10 - 20]  Carbon Dioxide, Serum18 mmol/L [17 - 32]  Chloride, Bzelw648 mmol/L [98 - 110]  Creatinie, Serum2.5 mg/dL<H> [0.7 - 1.5]  Glucose, Pvzmm499 mg/dL<H> [70 - 99]  Potassium, Serum5.9 mmol/L<H> [3.5 - 5.0]  Sodium, Serum 137 mmol/L [135 - 146]  [facility]  04-14-22 @ 12:45  Blood Gas Arterial-Calcium,Ionized--  Blood Urea Nitrogen, Serum 42 mg/dL<H> [10 - 20]  Carbon Dioxide, Serum19 mmol/L [17 - 32]  Chloride, Towcv339 mmol/L [98 - 110]  Creatinie, Serum2.7 mg/dL<H> [0.7 - 1.5]  Glucose, Iyghc001 mg/dL<H> [70 - 99]  Potassium, Serum4.5 mmol/L [3.5 - 5.0]  Sodium, Serum 137 mmol/L [135 - 146]        Medications:  acetaminophen     Tablet .. 650 milliGRAM(s) Oral every 6 hours PRN  heparin   Injectable 5000 Unit(s) SubCutaneous every 12 hours  NIFEdipine XL 30 milliGRAM(s) Oral daily  simvastatin 10 milliGRAM(s) Oral at bedtime  sodium bicarbonate 650 milliGRAM(s) Oral every 8 hours  sodium zirconium cyclosilicate 10 Gram(s) Oral daily        Assessment and Plan:  Patient is a 63 y/o  Female with  no pmhx went to see PMD and got routine blood work done Tuesday and  Was called by PMD  to come to the ER because her potassium was found to be elevated on blood work.    #JOSUE vs undiagnosed CKD progression  - s/p 2L LR bolus in ED  - renal function remains same post  IVF tx.  -BUN/CR- 40/2.4- 50/2.6  -s/p renal US- normal   - avoid nephrotoxic meds, renal diet - education was provided on  diet restrictions  - Renal team on board , patient was instructed to f/up with outpatient Nephrology specialist in next 7 days post d/c home to further manage newly dx. CKD  - proteinuria noted on urine analysis, elevated  prot/creat ratio- unable to start ace due to hyperkalemia  -f/up  SPEP, UPEP  - no abnormal casts in u/a  -started on oral sodium bicarb       #Normocytic anemia   - likely 2/2 to kidney failure  - denies active bleeding  - monitor Hb  - f/u iron studies    #Covid positive- asymptomatic/vaccinated   -maintain isolation  - monitor for now    # Newly dx. HTN- started on low salt diet, procardia XL 30 mg po once daily    # Newly dx. dyslipidemia- started on statins , low chol. diet    # Hyperkalemia- improved, continue low potassium diet.    DVT proph    #Progress Note Handoff:  d/c planning today , with outpatient PCP  and Nephrology specialist f/up .  Family discussion: Patient verbalized good understanding of discharge instructions and agreed with discharge plan.   Disposition: Home__today     Total time spent to complete patient's bedside assessment, review medical chart, discuss discharge  plan of care with covering medical team was more than 35 minutes      
  NARESH MAHMOOD  [facility] T2-3A 029 A ([facility] T2-3A)      Patient was evaluated and examined  by bedside, c/o mild dry cough, no hypoxia, no fever.       REVIEW OF SYSTEMS:  please see pertinent positives mentioned above, all other 12 ROS negative      T(C): , Max: 36.8 (04-14-22 @ 21:00)  HR: 62 (04-15-22 @ 05:00)  BP: 133/87 (04-15-22 @ 05:00)  RR: 18 (04-15-22 @ 05:00)  SpO2: 99% (04-15-22 @ 05:00)  CAPILLARY BLOOD GLUCOSE          PHYSICAL EXAM:  General: NAD, AAOX3, patient is laying comfortably in bed  HEENT: AT, NC, Supple, NO JVD, NO CB, injected conjunctive right eye  Lungs: CTA B/L, no wheezing, no rhonchi  CVS: normal S1, S2, RRR, NO M/G/R  Abdomen: soft, bowel sounds present, non-tender, non-distended  Extremities: no edema, no clubbing, no cyanosis, positive peripheral pulses b/l  Neuro: no acute focal neurological deficits  Skin: no rash, no ecchymosis      LAB  CBC  Date: 04-15-22 @ 04:30  Mean cell Errepcdxbj06.5  Mean cell Hemoglobin Conc34.8  Mean cell Volum 87.7  Platelet count-Automate 221  RBC Count 3.18  Red Cell Distrib Width12.4  WBC Count10.89  % Albumin, Urine--  Hematocrit 27.9  Hemoglobin 9.7  CBC  Date: 04-14-22 @ 12:45  Mean cell Vowxorcvrz65.4  Mean cell Hemoglobin Conc33.8  Mean cell Volum 90.1  Platelet count-Automate 227  RBC Count 3.22  Red Cell Distrib Width12.6  WBC Count9.66  % Albumin, Urine--  Hematocrit 29.0  Hemoglobin 9.8    BMP  04-15-22 @ 04:30  Blood Gas Arterial-Calcium,Ionized--  Blood Urea Nitrogen, Serum 40 mg/dL<H> [10 - 20]  Carbon Dioxide, Serum21 mmol/L [17 - 32]  Chloride, Sqgft295 mmol/L [98 - 110]  Creatinie, Serum2.4 mg/dL<H> [0.7 - 1.5]  Glucose, Serum91 mg/dL [70 - 99]  Potassium, Serum5.0 mmol/L [3.5 - 5.0]  Sodium, Serum 134 mmol/L<L> [135 - 146]  [facility]  04-14-22 @ 20:00  Blood Gas Arterial-Calcium,Ionized--  Blood Urea Nitrogen, Serum 43 mg/dL<H> [10 - 20]  Carbon Dioxide, Serum18 mmol/L [17 - 32]  Chloride, Cwyhl492 mmol/L [98 - 110]  Creatinie, Serum2.5 mg/dL<H> [0.7 - 1.5]  Glucose, Kyzzo701 mg/dL<H> [70 - 99]  Potassium, Serum5.9 mmol/L<H> [3.5 - 5.0]  Sodium, Serum 137 mmol/L [135 - 146]  [facility]  04-14-22 @ 12:45  Blood Gas Arterial-Calcium,Ionized--  Blood Urea Nitrogen, Serum 42 mg/dL<H> [10 - 20]  Carbon Dioxide, Serum19 mmol/L [17 - 32]  Chloride, Bftmg679 mmol/L [98 - 110]  Creatinie, Serum2.7 mg/dL<H> [0.7 - 1.5]  Glucose, Clwnd509 mg/dL<H> [70 - 99]  Potassium, Serum4.5 mmol/L [3.5 - 5.0]  Sodium, Serum 137 mmol/L [135 - 146]            Medications:  acetaminophen     Tablet .. 650 milliGRAM(s) Oral every 6 hours PRN  heparin   Injectable 5000 Unit(s) SubCutaneous every 12 hours  lactated ringers. 1000 milliLiter(s) IV Continuous <Continuous>  NIFEdipine XL 30 milliGRAM(s) Oral daily  sodium bicarbonate 650 milliGRAM(s) Oral every 8 hours  sodium zirconium cyclosilicate 10 Gram(s) Oral two times a day        Assessment and Plan:  	  Patient is a 61 y/o  Female with  no pmhx went to see PMD and got routine blood work done Tuesday and  Was called by PMD  to come to the ER because her potassium was found to be elevated on blood work.    #JOSUE vs undiagnosed CKD progression  - s/p 2L LR bolus in ED  - on IVF  -BUN/CR- 40/2.4  -s/p renal US- normal   - avoid nephrotoxic meds, renal diet   - Renal team on board   - proteinuria noted on urine analysis, f/up spot prot/creat ratio, SPEP, UPEP  - no abnormal casts in u/a  -started on oral sodium bicarb       #Normocytic anemia   - likely 2/2 to kidney failure  - denies active bleeding  - monitor Hb  - f/u iron studies    #Covid positive- asymptomatic/vaccinated   -maintain isolation  - monitor for now    # Newly dx. HTN- started on low salt diet, procardia XL 30 mg po once daily    # Newly dx. dyslipidemia- started on statins , low chol. diet    # Hyperkalemia- improved, continue low potassium diet.    DVT proph    #Progress Note Handoff:  monitor renal function, b/p, education on HTN, Dyslipidemia, CKD.  f/up renal work up  Family discussion: yes, medical team Disposition: Home__possibly in 24 hours.    Total time spent to complete patient's bedside assessment, review medical chart, discuss medical plan of care with covering medical team was more than 35 minutes        
SUBJECTIVE:  HPI:  62y F no pmhx went to see PMD and got routine blood work done Tuesday and  Was called by PMD today to come to the ER because her potassium was found to be elevated on blood work. Pt endorsing 3 episodes of loose stools today. Otherwise pt has no other medical complaints at this time. Pt does not take any medications. No chest pain/sob/weakness. No changes in diet. No abdominal pain.    In the ED, renal/bladder US was done and unremarkable. Labs are remarkable for Hb 9.8, Cr. 2.7, Hco3 19, BUN 42. She received 2L LR bolus and procardia 30 mg and started on bicarb 650 mg q8h.     ICU Vital Signs Last 24 Hrs  T(C): 37.1 (2022 12:09), Max: 37.1 (2022 12:09)  T(F): 98.8 (2022 12:09), Max: 98.8 (2022 12:09)  HR: 69 (2022 12:09) (69 - 69)  BP: 183/88 (2022 12:09) (183/88 - 183/88)  BP(mean): --  ABP: --  ABP(mean): --  RR: 18 (2022 12:09) (18 - 18)  SpO2: 98% (2022 12:09) (98% - 98%)   (2022 18:29)      Patient is a 62y old Female who presents with a chief complaint of josue (15 Apr 2022 12:28)    Currently admitted to medicine with the primary diagnosis of JOSUE (acute kidney injury)       Today is hospital day 1d.     PAST MEDICAL & SURGICAL HISTORY      ALLERGIES:  No Known Allergies    MEDICATIONS:  ACTIVE MEDICATIONS  acetaminophen     Tablet .. 650 milliGRAM(s) Oral every 6 hours PRN  heparin   Injectable 5000 Unit(s) SubCutaneous every 12 hours  lactated ringers. 1000 milliLiter(s) IV Continuous <Continuous>  NIFEdipine XL 30 milliGRAM(s) Oral daily  simvastatin 10 milliGRAM(s) Oral at bedtime  sodium bicarbonate 650 milliGRAM(s) Oral every 8 hours  sodium zirconium cyclosilicate 10 Gram(s) Oral two times a day      VITALS:   T(F): 96.3  HR: 72  BP: 138/81  RR: 18  SpO2: 99%    LABS:                        9.7    10.89 )-----------( 221      ( 15 Apr 2022 04:30 )             27.9     -15    134<L>  |  105  |  40<H>  ----------------------------<  91  5.0   |  21  |  2.4<H>    Ca    8.8      15 Apr 2022 04:30  Phos  4.1     -15    TPro  5.4<L>  /  Alb  3.3<L>  /  TBili  <0.2  /  DBili  x   /  AST  12  /  ALT  6   /  AlkPhos  81  -15      Urinalysis Basic - ( 2022 19:26 )    Color: Colorless / Appearance: Clear / S.005 / pH: x  Gluc: x / Ketone: Negative  / Bili: Negative / Urobili: <2 mg/dL   Blood: x / Protein: 100 mg/dL / Nitrite: Negative   Leuk Esterase: Negative / RBC: 3 /HPF / WBC 2 /HPF   Sq Epi: x / Non Sq Epi: 2 /HPF / Bacteria: Negative        Creatine Kinase, Serum: 41 U/L (04-15-22 @ 04:30)      CARDIAC MARKERS ( 15 Apr 2022 04:30 )  x     / x     / 41 U/L / x     / x              PHYSICAL EXAM:  GEN: No acute distress  LUNGS: Clear to auscultation bilaterally   HEART: S1/S2 present.    ABD: Soft, non-tender, non-distended.   EXT: No pedal edema, warm to touch, no discoloration  NEURO: AAOX3    A/P:    62y F no pmhx went to see PMD and got routine blood work done Tuesday and  Was called by PMD today to come to the ER because her potassium was found to be elevated on blood work.    #JOSUE vs CKD progression  - Unable to tell if patient has underlying CKD due to poor follow up   - s/p 2L LR bolus in ED  - avoid nephrotoxic meds  - Renal US normal  - FeNa 5.6, FeUrea 8.3- suggestive of post obstructive  - Prot/Cr ratio 6  - c/w LR @75cc/hr  - pending labs rec by nephro can follow OP    #Normocytic anemia   - likely 2/2 to kidney failure  - denies active bleeding  - monitor Hb  - f/u iron studies    #HTN  - Started procardia XL 30mg QD    #Dyslipidemia  - ASCVD score 6.5 to 10.9%  - Started simvastatin 10mg QD    #Covid positive- asymptomatic  - monitor for now    #DVT ppx: heparin  #GI ppx: not indicated  #Diet: DASH, low potassium  #Dispo: acute    Pending: Monitoring Cr  
Nephrology progress note    Patient is seen and examined, events over the last 24 h noted .  Pt is on IVF, not on renal diet orange juice banana on the tray)  Allergies:  No Known Allergies    Hospital Medications:   MEDICATIONS  (STANDING):  heparin   Injectable 5000 Unit(s) SubCutaneous every 12 hours  lactated ringers. 1000 milliLiter(s) (75 mL/Hr) IV Continuous <Continuous>  NIFEdipine XL 30 milliGRAM(s) Oral daily  sodium bicarbonate 650 milliGRAM(s) Oral every 8 hours  sodium zirconium cyclosilicate 10 Gram(s) Oral two times a day        VITALS:  T(F): 97.5 (04-15-22 @ 05:00), Max: 98.8 (22 @ 12:09)  HR: 62 (04-15-22 @ 05:00)  BP: 133/87 (04-15-22 @ 05:00)  RR: 18 (04-15-22 @ 05:00)  SpO2: 99% (04-15-22 @ 05:00)  Wt(kg): --      Weight (kg): 59 ( @ 12:09)    PHYSICAL EXAM:  Constitutional: NAD  HEENT: anicteric sclera,  Neck: No JVD  Respiratory: CTA  Cardiovascular: S1, S2, RRR  Gastrointestinal: BS+, soft, NT/ND  Extremities: No peripheral edema  Neurological: A/O x 3  : No CVA tenderness. No lane.   Skin: No rashes  Vascular Access:    LABS:  04-15    134<L>  |  105  |  40<H>  ----------------------------<  91  5.0   |  21  |  2.4<H>    Ca    8.8      15 Apr 2022 04:30  Phos  4.1     04-15    TPro  5.4<L>  /  Alb  3.3<L>  /  TBili  <0.2  /  DBili      /  AST  12  /  ALT  6   /  AlkPhos  81  04-15                          9.7    10.89 )-----------( 221      ( 15 Apr 2022 04:30 )             27.9       Urine Studies:  Urinalysis Basic - ( 2022 19:26 )    Color: Colorless / Appearance: Clear / S.005 / pH:   Gluc:  / Ketone: Negative  / Bili: Negative / Urobili: <2 mg/dL   Blood:  / Protein: 100 mg/dL / Nitrite: Negative   Leuk Esterase: Negative / RBC: 3 /HPF / WBC 2 /HPF   Sq Epi:  / Non Sq Epi: 2 /HPF / Bacteria: Negative      Osmolality, Random Urine: 203 mos/kg ( @ 19:26)  Sodium, Random Urine: 57.0 mmoL/L ( @ 19:26)  < from: US Kidney and Bladder (22 @ 15:01) >    Right kidney: 8.0 cm. No hydronephrosis or calculi.    Left kidney:  9.1 cm. No hydronephrosis or calculi. Exophytic 0.7 cm cyst.    Urinary bladder: No debris or calculus. Bilateral ureteral jets are   visualized. Prevoid volume of approximately 345 cc. No significant   postvoid residual.      IMPRESSION:    Normal renal and bladder ultrasound.    < end of copied text >    RADIOLOGY & ADDITIONAL STUDIES:  
seen and examined  24 h events noted   no distress       PAST HISTORY  --------------------------------------------------------------------------------  No significant changes to PMH, PSH, FHx, SHx, unless otherwise noted    ALLERGIES & MEDICATIONS  --------------------------------------------------------------------------------  Allergies    No Known Allergies    Intolerances      Standing Inpatient Medications  heparin   Injectable 5000 Unit(s) SubCutaneous every 12 hours  lactated ringers. 1000 milliLiter(s) IV Continuous <Continuous>  NIFEdipine XL 30 milliGRAM(s) Oral daily  simvastatin 10 milliGRAM(s) Oral at bedtime  sodium bicarbonate 650 milliGRAM(s) Oral every 8 hours  sodium zirconium cyclosilicate 10 Gram(s) Oral two times a day    PRN Inpatient Medications  acetaminophen     Tablet .. 650 milliGRAM(s) Oral every 6 hours PRN          VITALS/PHYSICAL EXAM  --------------------------------------------------------------------------------  T(C): 36.1 (04-16-22 @ 05:00), Max: 36.3 (04-15-22 @ 19:58)  HR: 55 (04-16-22 @ 05:00) (55 - 72)  BP: 114/84 (04-16-22 @ 05:00) (114/84 - 159/90)  RR: 18 (04-16-22 @ 05:00) (18 - 18)  SpO2: 100% (04-16-22 @ 05:00) (100% - 100%)  Wt(kg): --    Weight (kg): 59 (04-14-22 @ 12:09)      Physical Exam:  	Gen: NAD,  	Pulm: decrease BS B/L  	CV:S1S2; no rub  	Abd: distended  	    LABS/STUDIES  --------------------------------------------------------------------------------              8.9    7.37  >-----------<  200      [04-16-22 @ 04:30]              26.2     138  |  109  |  50  ----------------------------<  89      [04-16-22 @ 04:30]  5.1   |  21  |  2.6        Ca     8.3     [04-16-22 @ 04:30]      Mg     1.8     [04-16-22 @ 04:30]      Phos  4.1     [04-15-22 @ 04:30]    TPro  5.6  /  Alb  3.3  /  TBili  <0.2  /  DBili  x   /  AST  12  /  ALT  6   /  AlkPhos  81  [04-15-22 @ 04:30]      CK 41      [04-15-22 @ 04:30]    Creatinine Trend:  SCr 2.6 [04-16 @ 04:30]  SCr 2.4 [04-15 @ 04:30]  SCr 2.5 [04-14 @ 20:00]  SCr 2.7 [04-14 @ 12:45]    Urinalysis - [04-14-22 @ 19:26]      Color Colorless / Appearance Clear / SG 1.005 / pH 6.0      Gluc Negative / Ketone Negative  / Bili Negative / Urobili <2 mg/dL       Blood Small / Protein 100 mg/dL / Leuk Est Negative / Nitrite Negative      RBC 3 / WBC 2 / Hyaline 1 / Gran  / Sq Epi  / Non Sq Epi 2 / Bacteria Negative    Urine Creatinine 20      [04-14-22 @ 19:26]  Urine Protein 120      [04-14-22 @ 19:26]  Urine Sodium 57.0      [04-14-22 @ 19:26]  Urine Urea Nitrogen 208      [04-14-22 @ 19:26]  Urine Osmolality 203      [04-14-22 @ 19:26]    Ferritin 31      [04-15-22 @ 04:30]  PTH -- (Ca 8.7)      [04-15-22 @ 04:30]   54  Lipid: chol 238, , HDL 52, LDL --      [04-15-22 @ 04:30]    HBsAg Nonreact      [04-15-22 @ 04:30]  HCV 0.07, Nonreact      [04-15-22 @ 04:30]

## 2022-04-16 NOTE — DISCHARGE NOTE PROVIDER - NSDCMRMEDTOKEN_GEN_ALL_CORE_FT
Lokelma 10 g oral powder for reconstitution: 10 gram(s) orally once a day   NIFEdipine 30 mg oral tablet, extended release: 1 tab(s) orally once a day  simvastatin 10 mg oral tablet: 1 tab(s) orally once a day (at bedtime)  sodium bicarbonate 650 mg oral tablet: 1 tab(s) orally every 8 hours

## 2022-04-18 LAB — ANA TITR SER: NEGATIVE — SIGNIFICANT CHANGE UP

## 2022-04-19 LAB
AUTO DIFF PNL BLD: ABNORMAL
C-ANCA SER-ACNC: NEGATIVE — SIGNIFICANT CHANGE UP
DSDNA AB SER QL CLIF: NEGATIVE — SIGNIFICANT CHANGE UP
P-ANCA SER-ACNC: NEGATIVE — SIGNIFICANT CHANGE UP

## 2022-04-20 LAB
% ALBUMIN: 54.9 % — SIGNIFICANT CHANGE UP
% ALPHA 1: 4.6 % — SIGNIFICANT CHANGE UP
% ALPHA 2: 11.9 % — SIGNIFICANT CHANGE UP
% BETA: 13.9 % — SIGNIFICANT CHANGE UP
% GAMMA: 14.7 % — SIGNIFICANT CHANGE UP
ALBUMIN SERPL ELPH-MCNC: 3.1 G/DL — LOW (ref 3.6–5.5)
ALBUMIN/GLOB SERPL ELPH: 1.2 RATIO — SIGNIFICANT CHANGE UP
ALPHA1 GLOB SERPL ELPH-MCNC: 0.3 G/DL — SIGNIFICANT CHANGE UP (ref 0.1–0.4)
ALPHA2 GLOB SERPL ELPH-MCNC: 0.7 G/DL — SIGNIFICANT CHANGE UP (ref 0.5–1)
B-GLOBULIN SERPL ELPH-MCNC: 0.8 G/DL — SIGNIFICANT CHANGE UP (ref 0.5–1)
GAMMA GLOBULIN: 0.8 G/DL — SIGNIFICANT CHANGE UP (ref 0.6–1.6)
INTERPRETATION 24H UR IFE-IMP: SIGNIFICANT CHANGE UP
INTERPRETATION 24H UR IFE-IMP: SIGNIFICANT CHANGE UP
INTERPRETATION SERPL IFE-IMP: SIGNIFICANT CHANGE UP
PROT PATTERN SERPL ELPH-IMP: SIGNIFICANT CHANGE UP

## 2022-04-26 DIAGNOSIS — D63.1 ANEMIA IN CHRONIC KIDNEY DISEASE: ICD-10-CM

## 2022-04-26 DIAGNOSIS — I12.9 HYPERTENSIVE CHRONIC KIDNEY DISEASE WITH STAGE 1 THROUGH STAGE 4 CHRONIC KIDNEY DISEASE, OR UNSPECIFIED CHRONIC KIDNEY DISEASE: ICD-10-CM

## 2022-04-26 DIAGNOSIS — E87.2 ACIDOSIS: ICD-10-CM

## 2022-04-26 DIAGNOSIS — R80.9 PROTEINURIA, UNSPECIFIED: ICD-10-CM

## 2022-04-26 DIAGNOSIS — U07.1 COVID-19: ICD-10-CM

## 2022-04-26 DIAGNOSIS — J34.89 OTHER SPECIFIED DISORDERS OF NOSE AND NASAL SINUSES: ICD-10-CM

## 2022-04-26 DIAGNOSIS — E78.5 HYPERLIPIDEMIA, UNSPECIFIED: ICD-10-CM

## 2022-04-26 DIAGNOSIS — E87.5 HYPERKALEMIA: ICD-10-CM

## 2022-04-26 DIAGNOSIS — Z91.19 PATIENT'S NONCOMPLIANCE WITH OTHER MEDICAL TREATMENT AND REGIMEN: ICD-10-CM

## 2022-04-26 DIAGNOSIS — N17.9 ACUTE KIDNEY FAILURE, UNSPECIFIED: ICD-10-CM

## 2022-04-26 DIAGNOSIS — N18.4 CHRONIC KIDNEY DISEASE, STAGE 4 (SEVERE): ICD-10-CM

## 2023-05-24 NOTE — ED PROVIDER NOTE - CHILD ABUSE FACILITY
[facility] Post-Care Instructions: I reviewed with the patient in detail post-care instructions. Patient is not to engage in any heavy lifting, exercise, or swimming for the next 14 days. Should the patient develop any fevers, chills, bleeding, severe pain patient will contact the office immediately.

## 2023-06-30 NOTE — ED PROVIDER NOTE - TOBACCO USE
Detail Level: Zone Products Recommended: EltaMD sunscreens Unknown if ever smoked General Sunscreen Counseling: I recommended a broad spectrum sunscreen with a SPF of 30 or higher.  I explained that SPF 30 sunscreens block approximately 97 percent of the sun's harmful rays.  Sunscreens should be applied at least 15 minutes prior to expected sun exposure and then every 2 hours after that as long as sun exposure continues. If swimming or exercising sunscreen should be reapplied every 45 minutes to an hour after getting wet or sweating.  One ounce, or the equivalent of a shot glass full of sunscreen, is adequate to protect the skin not covered by a bathing suit. I also recommended a lip balm with a sunscreen as well. Sun protective clothing can be used in lieu of sunscreen but must be worn the entire time you are exposed to the sun's rays. abdomen Never smoker

## 2023-09-15 ENCOUNTER — INPATIENT (INPATIENT)
Facility: HOSPITAL | Age: 63
LOS: 3 days | Discharge: HOME | DRG: 469 | End: 2023-09-19
Attending: INTERNAL MEDICINE | Admitting: STUDENT IN AN ORGANIZED HEALTH CARE EDUCATION/TRAINING PROGRAM
Payer: MEDICAID

## 2023-09-15 VITALS
TEMPERATURE: 98 F | OXYGEN SATURATION: 99 % | SYSTOLIC BLOOD PRESSURE: 153 MMHG | DIASTOLIC BLOOD PRESSURE: 80 MMHG | HEART RATE: 65 BPM | RESPIRATION RATE: 16 BRPM

## 2023-09-15 DIAGNOSIS — E87.5 HYPERKALEMIA: ICD-10-CM

## 2023-09-15 LAB
ALBUMIN SERPL ELPH-MCNC: 4 G/DL — SIGNIFICANT CHANGE UP (ref 3.5–5.2)
ALP SERPL-CCNC: 108 U/L — SIGNIFICANT CHANGE UP (ref 30–115)
ALT FLD-CCNC: 6 U/L — SIGNIFICANT CHANGE UP (ref 0–41)
ANION GAP SERPL CALC-SCNC: 10 MMOL/L — SIGNIFICANT CHANGE UP (ref 7–14)
APPEARANCE UR: CLEAR — SIGNIFICANT CHANGE UP
AST SERPL-CCNC: 10 U/L — SIGNIFICANT CHANGE UP (ref 0–41)
BACTERIA # UR AUTO: NEGATIVE /HPF — SIGNIFICANT CHANGE UP
BASOPHILS # BLD AUTO: 0.03 K/UL — SIGNIFICANT CHANGE UP (ref 0–0.2)
BASOPHILS NFR BLD AUTO: 0.4 % — SIGNIFICANT CHANGE UP (ref 0–1)
BILIRUB SERPL-MCNC: <0.2 MG/DL — SIGNIFICANT CHANGE UP (ref 0.2–1.2)
BILIRUB UR-MCNC: NEGATIVE — SIGNIFICANT CHANGE UP
BUN SERPL-MCNC: 58 MG/DL — HIGH (ref 10–20)
CALCIUM SERPL-MCNC: 8.5 MG/DL — SIGNIFICANT CHANGE UP (ref 8.4–10.4)
CAST: 0 /LPF — SIGNIFICANT CHANGE UP (ref 0–4)
CHLORIDE SERPL-SCNC: 106 MMOL/L — SIGNIFICANT CHANGE UP (ref 98–110)
CO2 SERPL-SCNC: 16 MMOL/L — LOW (ref 17–32)
COLOR SPEC: YELLOW — SIGNIFICANT CHANGE UP
CREAT SERPL-MCNC: 4.6 MG/DL — CRITICAL HIGH (ref 0.7–1.5)
DIFF PNL FLD: ABNORMAL
EGFR: 10 ML/MIN/1.73M2 — LOW
EOSINOPHIL # BLD AUTO: 0.2 K/UL — SIGNIFICANT CHANGE UP (ref 0–0.7)
EOSINOPHIL NFR BLD AUTO: 2.4 % — SIGNIFICANT CHANGE UP (ref 0–8)
GAS PNL BLDA: SIGNIFICANT CHANGE UP
GLUCOSE BLDC GLUCOMTR-MCNC: 235 MG/DL — HIGH (ref 70–99)
GLUCOSE BLDC GLUCOMTR-MCNC: 51 MG/DL — CRITICAL LOW (ref 70–99)
GLUCOSE BLDC GLUCOMTR-MCNC: 94 MG/DL — SIGNIFICANT CHANGE UP (ref 70–99)
GLUCOSE SERPL-MCNC: 112 MG/DL — HIGH (ref 70–99)
GLUCOSE UR QL: NEGATIVE MG/DL — SIGNIFICANT CHANGE UP
HCT VFR BLD CALC: 30 % — LOW (ref 37–47)
HGB BLD-MCNC: 9.6 G/DL — LOW (ref 12–16)
IMM GRANULOCYTES NFR BLD AUTO: 0.4 % — HIGH (ref 0.1–0.3)
KETONES UR-MCNC: NEGATIVE MG/DL — SIGNIFICANT CHANGE UP
LEUKOCYTE ESTERASE UR-ACNC: NEGATIVE — SIGNIFICANT CHANGE UP
LYMPHOCYTES # BLD AUTO: 2.32 K/UL — SIGNIFICANT CHANGE UP (ref 1.2–3.4)
LYMPHOCYTES # BLD AUTO: 27.7 % — SIGNIFICANT CHANGE UP (ref 20.5–51.1)
MAGNESIUM SERPL-MCNC: 2 MG/DL — SIGNIFICANT CHANGE UP (ref 1.8–2.4)
MCHC RBC-ENTMCNC: 29.2 PG — SIGNIFICANT CHANGE UP (ref 27–31)
MCHC RBC-ENTMCNC: 32 G/DL — SIGNIFICANT CHANGE UP (ref 32–37)
MCV RBC AUTO: 91.2 FL — SIGNIFICANT CHANGE UP (ref 81–99)
MONOCYTES # BLD AUTO: 0.38 K/UL — SIGNIFICANT CHANGE UP (ref 0.1–0.6)
MONOCYTES NFR BLD AUTO: 4.5 % — SIGNIFICANT CHANGE UP (ref 1.7–9.3)
NEUTROPHILS # BLD AUTO: 5.42 K/UL — SIGNIFICANT CHANGE UP (ref 1.4–6.5)
NEUTROPHILS NFR BLD AUTO: 64.6 % — SIGNIFICANT CHANGE UP (ref 42.2–75.2)
NITRITE UR-MCNC: NEGATIVE — SIGNIFICANT CHANGE UP
NRBC # BLD: 0 /100 WBCS — SIGNIFICANT CHANGE UP (ref 0–0)
PH UR: 6.5 — SIGNIFICANT CHANGE UP (ref 5–8)
PLATELET # BLD AUTO: 230 K/UL — SIGNIFICANT CHANGE UP (ref 130–400)
PMV BLD: 10.6 FL — HIGH (ref 7.4–10.4)
POTASSIUM SERPL-MCNC: 6.5 MMOL/L — CRITICAL HIGH (ref 3.5–5)
POTASSIUM SERPL-SCNC: 6.5 MMOL/L — CRITICAL HIGH (ref 3.5–5)
PROT SERPL-MCNC: 6.7 G/DL — SIGNIFICANT CHANGE UP (ref 6–8)
PROT UR-MCNC: 30 MG/DL
RBC # BLD: 3.29 M/UL — LOW (ref 4.2–5.4)
RBC # FLD: 12.1 % — SIGNIFICANT CHANGE UP (ref 11.5–14.5)
RBC CASTS # UR COMP ASSIST: 0 /HPF — SIGNIFICANT CHANGE UP (ref 0–4)
SODIUM SERPL-SCNC: 132 MMOL/L — LOW (ref 135–146)
SP GR SPEC: 1.01 — SIGNIFICANT CHANGE UP (ref 1–1.03)
SQUAMOUS # UR AUTO: 0 /HPF — SIGNIFICANT CHANGE UP (ref 0–5)
UROBILINOGEN FLD QL: 0.2 MG/DL — SIGNIFICANT CHANGE UP (ref 0.2–1)
WBC # BLD: 8.38 K/UL — SIGNIFICANT CHANGE UP (ref 4.8–10.8)
WBC # FLD AUTO: 8.38 K/UL — SIGNIFICANT CHANGE UP (ref 4.8–10.8)
WBC UR QL: 0 /HPF — SIGNIFICANT CHANGE UP (ref 0–5)

## 2023-09-15 PROCEDURE — 85018 HEMOGLOBIN: CPT

## 2023-09-15 PROCEDURE — 83735 ASSAY OF MAGNESIUM: CPT

## 2023-09-15 PROCEDURE — 82607 VITAMIN B-12: CPT

## 2023-09-15 PROCEDURE — 82962 GLUCOSE BLOOD TEST: CPT

## 2023-09-15 PROCEDURE — 86901 BLOOD TYPING SEROLOGIC RH(D): CPT

## 2023-09-15 PROCEDURE — 83540 ASSAY OF IRON: CPT

## 2023-09-15 PROCEDURE — 36415 COLL VENOUS BLD VENIPUNCTURE: CPT

## 2023-09-15 PROCEDURE — 93010 ELECTROCARDIOGRAM REPORT: CPT

## 2023-09-15 PROCEDURE — 99223 1ST HOSP IP/OBS HIGH 75: CPT

## 2023-09-15 PROCEDURE — 82803 BLOOD GASES ANY COMBINATION: CPT

## 2023-09-15 PROCEDURE — 82728 ASSAY OF FERRITIN: CPT

## 2023-09-15 PROCEDURE — 82746 ASSAY OF FOLIC ACID SERUM: CPT

## 2023-09-15 PROCEDURE — 80048 BASIC METABOLIC PNL TOTAL CA: CPT

## 2023-09-15 PROCEDURE — 85014 HEMATOCRIT: CPT

## 2023-09-15 PROCEDURE — 99285 EMERGENCY DEPT VISIT HI MDM: CPT

## 2023-09-15 PROCEDURE — 82330 ASSAY OF CALCIUM: CPT

## 2023-09-15 PROCEDURE — 83550 IRON BINDING TEST: CPT

## 2023-09-15 PROCEDURE — 84295 ASSAY OF SERUM SODIUM: CPT

## 2023-09-15 PROCEDURE — 86900 BLOOD TYPING SEROLOGIC ABO: CPT

## 2023-09-15 PROCEDURE — 86850 RBC ANTIBODY SCREEN: CPT

## 2023-09-15 PROCEDURE — 80053 COMPREHEN METABOLIC PANEL: CPT

## 2023-09-15 PROCEDURE — 87338 HPYLORI STOOL AG IA: CPT

## 2023-09-15 PROCEDURE — 84100 ASSAY OF PHOSPHORUS: CPT

## 2023-09-15 PROCEDURE — 84132 ASSAY OF SERUM POTASSIUM: CPT

## 2023-09-15 PROCEDURE — 76770 US EXAM ABDO BACK WALL COMP: CPT

## 2023-09-15 PROCEDURE — 85025 COMPLETE CBC W/AUTO DIFF WBC: CPT

## 2023-09-15 PROCEDURE — 85610 PROTHROMBIN TIME: CPT

## 2023-09-15 PROCEDURE — 83605 ASSAY OF LACTIC ACID: CPT

## 2023-09-15 PROCEDURE — 93971 EXTREMITY STUDY: CPT | Mod: RT

## 2023-09-15 PROCEDURE — 85730 THROMBOPLASTIN TIME PARTIAL: CPT

## 2023-09-15 RX ORDER — CALCIUM GLUCONATE 100 MG/ML
2 VIAL (ML) INTRAVENOUS ONCE
Refills: 0 | Status: COMPLETED | OUTPATIENT
Start: 2023-09-15 | End: 2023-09-15

## 2023-09-15 RX ORDER — HEPARIN SODIUM 5000 [USP'U]/ML
5000 INJECTION INTRAVENOUS; SUBCUTANEOUS EVERY 8 HOURS
Refills: 0 | Status: DISCONTINUED | OUTPATIENT
Start: 2023-09-15 | End: 2023-09-19

## 2023-09-15 RX ORDER — DEXTROSE 50 % IN WATER 50 %
25 SYRINGE (ML) INTRAVENOUS ONCE
Refills: 0 | Status: COMPLETED | OUTPATIENT
Start: 2023-09-15 | End: 2023-09-15

## 2023-09-15 RX ORDER — SODIUM BICARBONATE 1 MEQ/ML
1300 SYRINGE (ML) INTRAVENOUS THREE TIMES A DAY
Refills: 0 | Status: DISCONTINUED | OUTPATIENT
Start: 2023-09-15 | End: 2023-09-19

## 2023-09-15 RX ORDER — SIMVASTATIN 20 MG/1
10 TABLET, FILM COATED ORAL AT BEDTIME
Refills: 0 | Status: DISCONTINUED | OUTPATIENT
Start: 2023-09-15 | End: 2023-09-19

## 2023-09-15 RX ORDER — DEXTROSE 50 % IN WATER 50 %
50 SYRINGE (ML) INTRAVENOUS ONCE
Refills: 0 | Status: COMPLETED | OUTPATIENT
Start: 2023-09-15 | End: 2023-09-15

## 2023-09-15 RX ORDER — SODIUM ZIRCONIUM CYCLOSILICATE 10 G/10G
10 POWDER, FOR SUSPENSION ORAL ONCE
Refills: 0 | Status: COMPLETED | OUTPATIENT
Start: 2023-09-15 | End: 2023-09-15

## 2023-09-15 RX ORDER — INSULIN HUMAN 100 [IU]/ML
5 INJECTION, SOLUTION SUBCUTANEOUS ONCE
Refills: 0 | Status: COMPLETED | OUTPATIENT
Start: 2023-09-15 | End: 2023-09-15

## 2023-09-15 RX ORDER — NIFEDIPINE 30 MG
60 TABLET, EXTENDED RELEASE 24 HR ORAL DAILY
Refills: 0 | Status: DISCONTINUED | OUTPATIENT
Start: 2023-09-15 | End: 2023-09-19

## 2023-09-15 RX ADMIN — Medication 200 GRAM(S): at 20:08

## 2023-09-15 RX ADMIN — Medication 25 MILLILITER(S): at 21:48

## 2023-09-15 RX ADMIN — Medication 50 GRAM(S): at 20:09

## 2023-09-15 RX ADMIN — SODIUM ZIRCONIUM CYCLOSILICATE 10 GRAM(S): 10 POWDER, FOR SUSPENSION ORAL at 20:08

## 2023-09-15 RX ADMIN — INSULIN HUMAN 5 UNIT(S): 100 INJECTION, SOLUTION SUBCUTANEOUS at 20:08

## 2023-09-15 NOTE — CHART NOTE - NSCHARTNOTEFT_GEN_A_CORE
63-year-old female with past medical history of hypertension, hyperlipidemia, CKD5 sent in by nephrologist for elevated potassium on routine lab work yesterday.  Patient with no clinical symptoms of uremia or ESKD. Repeated K 6.5.    Assessment and plan  CKD 5/ Hyperkalemia/ HTN  - No ECG changes, normal VS  - patient still makes urine  - HAGMA likely secondary to uremia, however check lactate and vbg  - s/p medical treatment for hyperkalemia  - admit patient to tele  - repeat VBG with lytes and lactate stat  - If K > 6 give lasix 40 mg iv once  - Expect K to improve with medical treatment  - consult IR for TDC placement  - no urgent indications for RRT today  - will start HD on this admission after access placement  - c/w lokelma 10 g bid  - increase sodium bicarb to 1300 mg tid  - renal diet and water restriction  - Complete consult in AM

## 2023-09-15 NOTE — H&P ADULT - NSHPLABSRESULTS_GEN_ALL_CORE
Labs: Hb 9.6(8.9 4/22), MCV 91, Na 132, K 6.5, Cr 4.6(3.9 8/23), BUN 58, Co2 16  UA - Protein 30mg/dl, trace blood

## 2023-09-15 NOTE — ED PROVIDER NOTE - EKG ADDITIONAL INFORMATION FREE TEXT
Sinus bradycardia to rate of 59, QRS, QTc within normal limits, no signs of acute ischemia or ekg changes suggestive of hyperkalemia

## 2023-09-15 NOTE — H&P ADULT - ASSESSMENT
Patient is a 63-year-old female with past medical history of hypertension, hyperlipidemia, CKD sent in by Dr. Eric Clayton for elevated potassium, creatinine on routine lab work yesterday. Admitted to tele for management of hyperkalemia.    #Hyperkalemia  #CKD 5  #HAGMA likely uremia  - Follows with Dr. Dale  - Cr 4.6(3.9 8/23), BUN 58, Co2 16, K+ 6.5 on admission  - ABG - Ph 7.29, pco2 31, hco3 15, k+ 4.7, lactate 0.7  - No ECG changes, normal VS  - s/p insulin d50, ca gluconate 2gm, lokelma 10mg  - sodium bicarb 1300mg tid  - monitor under tele  - repeat bmp in am  - IR for TDC per nephro  - nephro following    #Chronic normocytic anemia likely AoCD  - Hb 9.6(8.9 4/22), MCV 91  - iron panel, b12, folate  - agustin per nephro after iron repletion     #Htn  - resume home meds    #HLD  - continue with home meds    DVT - Heparin sq  GI prophylaxis not indicated  Diet - renal diet   AAT

## 2023-09-15 NOTE — ED ADULT TRIAGE NOTE - CHIEF COMPLAINT QUOTE
As per daughter, "The doctor called telling us to come to ER because her potassium is greater than 6.0 and creatinine 4.6."

## 2023-09-15 NOTE — H&P ADULT - ATTENDING COMMENTS
63-year-old female with past medical history of hypertension, hyperlipidemia, CKD sent in by Dr. Eric Clayton for elevated potassium, creatinine on routine lab work yesterday. Admitted to tele for management of hyperkalemia.  Agree  with assessment  except for changes below.     Vital Signs (24 Hrs):  T(C): 36.4 (09-15-23 @ 16:38), Max: 36.4 (09-15-23 @ 16:38)  HR: 57 (09-16-23 @ 00:48) (57 - 65)  BP: 140/80 (09-16-23 @ 00:48) (140/80 - 153/80)  RR: 18 (09-16-23 @ 00:48) (16 - 18)  SpO2: 100% (09-16-23 @ 00:48) (99% - 100%)        IMPRESSION   Asymptomatic Hyperkalemia in the setting of CKD 5   HAGMA Possible Uremia   Follows with Dr. Dale  Cr 4.6(3.9 8/23), BUN 58, Co2 16, K+ 6.5 on admission  ABG - Ph 7.29, pco2 31, hco3 15, k+ 4.7, lactate 0.7  No ECG changes, normal VS  S/p insulin d50, ca gluconate 2gm, lokelma 10mg  sodium bicarb 1300mg tid  Monitor under tele  repeat bmp in am  IR for TDC per nephro  nephro following    Hx Chronic Normocytic Anemia  in the  Setting CKD5  possible  AoCD   Hb 9.6(8.9 4/22), MCV 91  Iron panel, b12, folate  REMA  per nephro after iron repletion       Hx HTN   - resume  Nifedipine     Hx HLD  - continue Simvastatin 63-year-old female with past medical history of hypertension, hyperlipidemia, CKD sent in by Dr. Eric Clayton for elevated potassium, creatinine on routine lab work yesterday. Admitted to tele for management of hyperkalemia.  Agree  with assessment  except for changes below.     Vital Signs (24 Hrs):  T(C): 36.4 (09-15-23 @ 16:38), Max: 36.4 (09-15-23 @ 16:38)  HR: 57 (09-16-23 @ 00:48) (57 - 65)  BP: 140/80 (09-16-23 @ 00:48) (140/80 - 153/80)  RR: 18 (09-16-23 @ 00:48) (16 - 18)  SpO2: 100% (09-16-23 @ 00:48) (99% - 100%)       VITAL SIGNS: AFebrile, vital signs stable  CONSTITUTIONAL: Well-developed; well-nourished; in no acute distress.  SKIN: Skin exam is warm and dry, no acute rash.  HEAD: Normocephalic; atraumatic.  EYES: Extraocular movements intact  ENT: No nasal discharge; airway clear. Moist mucus membranes.  NECK: Supple; non tender. No rigidity  CARD: Rregular rate and rhythm. Normal S1, S2; no murmurs, gallops, or rubs.  RESP: On  auscultation bilaterally. No wheezes, rales or rhonchi.  ABD: Abdomen soft; non-tender; non-distended  EXT: Normal ROM. No clubbing, cyanosis or edema.   NEURO: Alert and oriented x 3. No focal deficits.  PSYCH: cooperative, appropriate.     IMPRESSION   Asymptomatic Hyperkalemia in the setting of CKD 5   CKD5 Progression   HAGMA Possible Uremia   Follows with Dr. Dale  Cr 4.6(3.9 8/23), BUN 58, Co2 16, K+ 6.5 on admission  ABG - Ph 7.29, pco2 31, hco3 15, k+ 4.7, lactate 0.7  No ECG changes, normal VS  S/p insulin d50, ca gluconate 2gm, lokelma 10mg  sodium bicarb 1300mg tid  Monitor under tele  repeat bmp in am  IR for TDC per nephro  nephro following    Hx Chronic Normocytic Anemia  in the  Setting CKD5  possible  AoCD   Hb 9.6(8.9 4/22), MCV 91  Iron panel, b12, folate  REMA  per nephro after iron repletion       Hx HTN   - resume  Nifedipine     Hx HLD  - continue Simvastatin    Seen on 09/15/23

## 2023-09-15 NOTE — ED ADULT NURSE NOTE - NSFALLUNIVINTERV_ED_ALL_ED
Bed/Stretcher in lowest position, wheels locked, appropriate side rails in place/Call bell, personal items and telephone in reach/Instruct patient to call for assistance before getting out of bed/chair/stretcher/Non-slip footwear applied when patient is off stretcher/[place] to call system/Physically safe environment - no spills, clutter or unnecessary equipment/Purposeful proactive rounding/Room/bathroom lighting operational, light cord in reach

## 2023-09-15 NOTE — ED ADULT NURSE NOTE - OBJECTIVE STATEMENT
Pt sent in by MD for abnormal blood work pt has elevated potasium level. pt has no complaints at this time , pt still makes urine

## 2023-09-15 NOTE — H&P ADULT - HISTORY OF PRESENT ILLNESS
Patient is a 63-year-old female with past medical history of hypertension, hyperlipidemia, CKD sent in by Dr. Eric Clayton for elevated potassium, creatinine on routine lab work yesterday. Patient follows with Dr. Eric Clayton for nephrology, has been having monthly blood draws recently for worsening kidney function.  Patient denies fever, chills, change in p.o. intake, endorses normal urinary output.  Denies urinary symptoms.  Denies abdominal pain, diarrhea.  Denies headache.  Patient says she feels at baseline, otherwise well.    In the ED  Vitals: 153/80, HR 65, Temp 97.6, RR 16, Sao2 99% on RA  Labs: Hb 9.6, MCV 91, Na 132, K 5.6, BUN 58, Co2 16, Cr 4.6   Patient is a 63-year-old female with past medical history of hypertension, hyperlipidemia, CKD sent in by Dr. Eric Clayton for elevated potassium, creatinine on routine lab work yesterday. Patient follows with Dr. Eric Clayton for nephrology, has been having monthly blood draws recently for worsening kidney function.  Patient denies fever, chills, change in p.o. intake, endorses normal urinary output.  Denies urinary symptoms.  Denies abdominal pain, diarrhea.  Denies headache.  Patient says she feels at baseline, otherwise well.    In the ED  Vitals: 153/80, HR 65, Temp 97.6, RR 16, Sao2 99% on RA  Labs: Hb 9.6(8.9 4/22), MCV 91, Na 132, K 6.5, Cr 4.6(3.9 8/23), BUN 58, Co2 16  UA - Protein 30mg/dl, trace blood    s/p insulin d50, ca gluconate 2gm, lokelma 10mg and admitted to tele

## 2023-09-15 NOTE — ED PROVIDER NOTE - PROGRESS NOTE DETAILS
Potassium elevated, no EKG changes, will treat with calcium, insulin, glucose, [place], spoke to nephrology fellow, agree with plan, states that he will reach out to nephro attending and get back regarding additional management -CD Spoke to nephro follow again, advised admission, states patient's may need to start dialysis upon this admission, however does not need it emergently.  Spoke to ICU fellow regarding possible admission to ICU, stepdown, states that he does not believe patient is appropriate for ICU or stepdown at this time, advise med telemetry admission at this time -CD

## 2023-09-15 NOTE — ED PROVIDER NOTE - CARE PLAN
1 Principal Discharge DX:	Serum potassium elevated  Secondary Diagnosis:	Elevated serum creatinine

## 2023-09-15 NOTE — H&P ADULT - NSHPPHYSICALEXAM_GEN_ALL_CORE
Gen: calm, no apparent distress  Eyes: bilateral equal and reactive  HENT: normal ear and throat exam  CV: normal s1/s2, no murmur  Resp: bilateral clear  Abd: soft, non tender  Back: No CVAT bilaterally, no midline ttp  Skin: No rash  MSK: No LE edema  Neuro: AOx4, no focal deficit

## 2023-09-15 NOTE — ED PROVIDER NOTE - OBJECTIVE STATEMENT
Patient is a 63-year-old female with past medical history of hypertension, hyperlipidemia, CKD sent in by nephrologist for elevated potassium, creatinine on routine lab work yesterday.  Patient follows with Dr. Eric Tyler for nephrology, has been having monthly blood draws recently for worsening kidney function.  Patient denies fever, chills, change in p.o. intake, endorses normal urinary output.  Denies urinary symptoms.  Denies abdominal pain, diarrhea.  Denies headache.  Patient says she feels at baseline, otherwise well

## 2023-09-16 LAB
ALBUMIN SERPL ELPH-MCNC: 3.3 G/DL — LOW (ref 3.5–5.2)
ALP SERPL-CCNC: 95 U/L — SIGNIFICANT CHANGE UP (ref 30–115)
ALT FLD-CCNC: 6 U/L — SIGNIFICANT CHANGE UP (ref 0–41)
ANION GAP SERPL CALC-SCNC: 13 MMOL/L — SIGNIFICANT CHANGE UP (ref 7–14)
APTT BLD: 33.4 SEC — SIGNIFICANT CHANGE UP (ref 27–39.2)
AST SERPL-CCNC: 11 U/L — SIGNIFICANT CHANGE UP (ref 0–41)
BASOPHILS # BLD AUTO: 0.02 K/UL — SIGNIFICANT CHANGE UP (ref 0–0.2)
BASOPHILS NFR BLD AUTO: 0.3 % — SIGNIFICANT CHANGE UP (ref 0–1)
BILIRUB SERPL-MCNC: <0.2 MG/DL — SIGNIFICANT CHANGE UP (ref 0.2–1.2)
BUN SERPL-MCNC: 55 MG/DL — HIGH (ref 10–20)
CALCIUM SERPL-MCNC: 8.8 MG/DL — SIGNIFICANT CHANGE UP (ref 8.4–10.4)
CHLORIDE SERPL-SCNC: 107 MMOL/L — SIGNIFICANT CHANGE UP (ref 98–110)
CO2 SERPL-SCNC: 16 MMOL/L — LOW (ref 17–32)
CREAT SERPL-MCNC: 4.6 MG/DL — CRITICAL HIGH (ref 0.7–1.5)
CULTURE RESULTS: NO GROWTH — SIGNIFICANT CHANGE UP
EGFR: 10 ML/MIN/1.73M2 — LOW
EOSINOPHIL # BLD AUTO: 0.21 K/UL — SIGNIFICANT CHANGE UP (ref 0–0.7)
EOSINOPHIL NFR BLD AUTO: 2.7 % — SIGNIFICANT CHANGE UP (ref 0–8)
FERRITIN SERPL-MCNC: 36 NG/ML — SIGNIFICANT CHANGE UP (ref 13–330)
FOLATE SERPL-MCNC: 11.4 NG/ML — SIGNIFICANT CHANGE UP
GLUCOSE BLDC GLUCOMTR-MCNC: 166 MG/DL — HIGH (ref 70–99)
GLUCOSE SERPL-MCNC: 161 MG/DL — HIGH (ref 70–99)
HCT VFR BLD CALC: 26.6 % — LOW (ref 37–47)
HGB BLD-MCNC: 9 G/DL — LOW (ref 12–16)
IMM GRANULOCYTES NFR BLD AUTO: 0.3 % — SIGNIFICANT CHANGE UP (ref 0.1–0.3)
INR BLD: 0.86 RATIO — SIGNIFICANT CHANGE UP (ref 0.65–1.3)
IRON SATN MFR SERPL: 101 UG/DL — SIGNIFICANT CHANGE UP (ref 35–150)
IRON SATN MFR SERPL: 43 % — SIGNIFICANT CHANGE UP (ref 15–50)
LYMPHOCYTES # BLD AUTO: 2.29 K/UL — SIGNIFICANT CHANGE UP (ref 1.2–3.4)
LYMPHOCYTES # BLD AUTO: 29.1 % — SIGNIFICANT CHANGE UP (ref 20.5–51.1)
MAGNESIUM SERPL-MCNC: 1.9 MG/DL — SIGNIFICANT CHANGE UP (ref 1.8–2.4)
MCHC RBC-ENTMCNC: 30.4 PG — SIGNIFICANT CHANGE UP (ref 27–31)
MCHC RBC-ENTMCNC: 33.8 G/DL — SIGNIFICANT CHANGE UP (ref 32–37)
MCV RBC AUTO: 89.9 FL — SIGNIFICANT CHANGE UP (ref 81–99)
MONOCYTES # BLD AUTO: 0.29 K/UL — SIGNIFICANT CHANGE UP (ref 0.1–0.6)
MONOCYTES NFR BLD AUTO: 3.7 % — SIGNIFICANT CHANGE UP (ref 1.7–9.3)
NEUTROPHILS # BLD AUTO: 5.05 K/UL — SIGNIFICANT CHANGE UP (ref 1.4–6.5)
NEUTROPHILS NFR BLD AUTO: 63.9 % — SIGNIFICANT CHANGE UP (ref 42.2–75.2)
NRBC # BLD: 0 /100 WBCS — SIGNIFICANT CHANGE UP (ref 0–0)
PLATELET # BLD AUTO: 205 K/UL — SIGNIFICANT CHANGE UP (ref 130–400)
PMV BLD: 10.7 FL — HIGH (ref 7.4–10.4)
POTASSIUM SERPL-MCNC: 4.9 MMOL/L — SIGNIFICANT CHANGE UP (ref 3.5–5)
POTASSIUM SERPL-SCNC: 4.9 MMOL/L — SIGNIFICANT CHANGE UP (ref 3.5–5)
PROT SERPL-MCNC: 5.8 G/DL — LOW (ref 6–8)
PROTHROM AB SERPL-ACNC: 9.8 SEC — LOW (ref 9.95–12.87)
RBC # BLD: 2.96 M/UL — LOW (ref 4.2–5.4)
RBC # FLD: 12.1 % — SIGNIFICANT CHANGE UP (ref 11.5–14.5)
SODIUM SERPL-SCNC: 136 MMOL/L — SIGNIFICANT CHANGE UP (ref 135–146)
SPECIMEN SOURCE: SIGNIFICANT CHANGE UP
TIBC SERPL-MCNC: 233 UG/DL — SIGNIFICANT CHANGE UP (ref 220–430)
UIBC SERPL-MCNC: 132 UG/DL — SIGNIFICANT CHANGE UP (ref 110–370)
VIT B12 SERPL-MCNC: 235 PG/ML — SIGNIFICANT CHANGE UP (ref 232–1245)
WBC # BLD: 7.88 K/UL — SIGNIFICANT CHANGE UP (ref 4.8–10.8)
WBC # FLD AUTO: 7.88 K/UL — SIGNIFICANT CHANGE UP (ref 4.8–10.8)

## 2023-09-16 PROCEDURE — 99232 SBSQ HOSP IP/OBS MODERATE 35: CPT

## 2023-09-16 RX ORDER — LABETALOL HCL 100 MG
10 TABLET ORAL ONCE
Refills: 0 | Status: DISCONTINUED | OUTPATIENT
Start: 2023-09-16 | End: 2023-09-16

## 2023-09-16 RX ORDER — SODIUM ZIRCONIUM CYCLOSILICATE 10 G/10G
10 POWDER, FOR SUSPENSION ORAL EVERY 12 HOURS
Refills: 0 | Status: DISCONTINUED | OUTPATIENT
Start: 2023-09-16 | End: 2023-09-19

## 2023-09-16 RX ORDER — HYDRALAZINE HCL 50 MG
10 TABLET ORAL ONCE
Refills: 0 | Status: COMPLETED | OUTPATIENT
Start: 2023-09-16 | End: 2023-09-16

## 2023-09-16 RX ORDER — PANTOPRAZOLE SODIUM 20 MG/1
40 TABLET, DELAYED RELEASE ORAL
Refills: 0 | Status: DISCONTINUED | OUTPATIENT
Start: 2023-09-16 | End: 2023-09-19

## 2023-09-16 RX ORDER — ACETAMINOPHEN 500 MG
650 TABLET ORAL ONCE
Refills: 0 | Status: COMPLETED | OUTPATIENT
Start: 2023-09-16 | End: 2023-09-16

## 2023-09-16 RX ADMIN — SIMVASTATIN 10 MILLIGRAM(S): 20 TABLET, FILM COATED ORAL at 21:10

## 2023-09-16 RX ADMIN — Medication 1300 MILLIGRAM(S): at 21:10

## 2023-09-16 RX ADMIN — Medication 1300 MILLIGRAM(S): at 13:38

## 2023-09-16 RX ADMIN — HEPARIN SODIUM 5000 UNIT(S): 5000 INJECTION INTRAVENOUS; SUBCUTANEOUS at 05:03

## 2023-09-16 RX ADMIN — HEPARIN SODIUM 5000 UNIT(S): 5000 INJECTION INTRAVENOUS; SUBCUTANEOUS at 21:10

## 2023-09-16 RX ADMIN — Medication 1300 MILLIGRAM(S): at 00:51

## 2023-09-16 RX ADMIN — Medication 650 MILLIGRAM(S): at 22:39

## 2023-09-16 RX ADMIN — Medication 650 MILLIGRAM(S): at 22:09

## 2023-09-16 RX ADMIN — Medication 10 MILLIGRAM(S): at 23:18

## 2023-09-16 RX ADMIN — HEPARIN SODIUM 5000 UNIT(S): 5000 INJECTION INTRAVENOUS; SUBCUTANEOUS at 13:38

## 2023-09-16 RX ADMIN — Medication 1300 MILLIGRAM(S): at 05:02

## 2023-09-16 RX ADMIN — Medication 60 MILLIGRAM(S): at 05:02

## 2023-09-16 NOTE — PROGRESS NOTE ADULT - ASSESSMENT
63-year-old female with past medical history of hypertension, hyperlipidemia, CKD sent in by Dr. Eric Clayton for elevated potassium, creatinine on routine lab work yesterday. Admitted to tele for management of hyperkalemia.    #Hyperkalemia improving   #maral on ckd4  #HAGMA likely uremia  - Cr 4.6(3.9 8/23), BUN 58, Co2 16, K+ 6.5 on admission  - ABG - Ph 7.29, pco2 31, hco3 15, k+ 4.7, lactate 0.7  - No ECG changes, normal VS  - continue sodium bicarb 1300mg tid  - continue lokelma 10 bid   - IR vein mapping / av access   - strict i/o  - check renal us   - check phos   - nephro following- no acute indication for RRT     #Chronic normocytic anemia likely AoCD  - Hb 9.6(8.9 4/22), MCV 91  - iron panel noted  - agustin per nephro after iron repletion     #Htn  - resume home meds    #HLD  - continue with home meds    DVT - Heparin sq  GI prophylaxis not indicated  Diet - renal diet   AAT

## 2023-09-16 NOTE — PATIENT PROFILE ADULT - NSPROGENOTHERPROVIDER_GEN_A_NUR
none Afx Histology Text: Mild to severe solar elastosis is noted.  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

## 2023-09-16 NOTE — PATIENT PROFILE ADULT - DO YOU FEEL THREATENED BY OTHERS?
Post-Care Instructions: I reviewed with the patient in detail post-care instructions. Patient is to wear sunprotection, and avoid picking at any of the treated lesions. Pt may apply Vaseline to crusted or scabbing areas. Duration Of Freeze Thaw-Cycle (Seconds): 0 Render Note In Bullet Format When Appropriate: No Show Applicator Variable?: Yes Consent: The patient's consent was obtained including but not limited to risks of crusting, scabbing, blistering, scarring, darker or lighter pigmentary change, recurrence, incomplete removal and infection. Number Of Freeze-Thaw Cycles: 3 freeze-thaw cycles Detail Level: Detailed no

## 2023-09-16 NOTE — CONSULT NOTE ADULT - ASSESSMENT
stage 5 ckd ? JOSUE baseline cr in the 2 range / hyperkalemia  cr stable   document UO   check bladder scan   k improved   continue lokelma  check ph level  if bp remains on the low side/ decrease nifedipine   IR vein mapping / av access   continue sodium bicarbonate  sat elevated  / h/h noted start procrit 5000 units s/c weekly  no acute indication for RRT   will follow

## 2023-09-16 NOTE — PATIENT PROFILE ADULT - STATED REASON FOR ADMISSION
As per daughter, "The doctor called telling us to come to ER because her potassium is greater than 6.0 and creatinine 4.6."  abnormal lab result

## 2023-09-16 NOTE — PROGRESS NOTE ADULT - SUBJECTIVE AND OBJECTIVE BOX
SUBJECTIVE:    Patient is a 63y old Female who presents with a chief complaint of abnormal op labs (16 Sep 2023 13:53)    Currently admitted to medicine with the primary diagnosis of: hyperkalemia    Today is hospital day 1d.     Overnight Events:     Interval Events:    No acute overnight events. patient is tolerating po intake, patient is having BM, and urination. Patient is ambutlating. No active  complaints.       PAST MEDICAL & SURGICAL HISTORY  Hypertension    Hyperlipemia    No significant past surgical history    ALLERGIES:  No Known Allergies    MEDICATIONS:  STANDING MEDICATIONS  heparin   Injectable 5000 Unit(s) SubCutaneous every 8 hours  NIFEdipine XL 60 milliGRAM(s) Oral daily  simvastatin 10 milliGRAM(s) Oral at bedtime  sodium bicarbonate 1300 milliGRAM(s) Oral three times a day  sodium zirconium cyclosilicate 10 Gram(s) Oral every 12 hours    PRN MEDICATIONS    VITALS:   ICU Vital Signs Last 24 Hrs  T(C): 36.8 (16 Sep 2023 07:35), Max: 36.8 (16 Sep 2023 05:05)  T(F): 98.3 (16 Sep 2023 07:35), Max: 98.3 (16 Sep 2023 07:35)  HR: 66 (16 Sep 2023 07:35) (57 - 66)  BP: 110/70 (16 Sep 2023 07:35) (110/70 - 153/80)  BP(mean): 85 (16 Sep 2023 07:35) (64 - 85)  RR: 18 (16 Sep 2023 07:35) (16 - 18)  SpO2: 100% (16 Sep 2023 07:35) (99% - 100%)    O2 Parameters below as of 16 Sep 2023 07:35  Patient On (Oxygen Delivery Method): room air    LABS:                        9.0    7.88  )-----------( 205      ( 16 Sep 2023 08:16 )             26.6     09-16    136  |  107  |  55<H>  ----------------------------<  161<H>  4.9   |  16<L>  |  4.6<HH>    Ca    8.8      16 Sep 2023 08:16  Mg     1.9     09-16    TPro  5.8<L>  /  Alb  3.3<L>  /  TBili  <0.2  /  DBili  x   /  AST  11  /  ALT  6   /  AlkPhos  95  09-16    PT/INR - ( 16 Sep 2023 11:05 )   PT: 9.80 sec;   INR: 0.86 ratio         PTT - ( 16 Sep 2023 11:05 )  PTT:33.4 sec  Urinalysis Basic - ( 16 Sep 2023 08:16 )    Color: x / Appearance: x / SG: x / pH: x  Gluc: 161 mg/dL / Ketone: x  / Bili: x / Urobili: x   Blood: x / Protein: x / Nitrite: x   Leuk Esterase: x / RBC: x / WBC x   Sq Epi: x / Non Sq Epi: x / Bacteria: x      ABG - ( 15 Sep 2023 21:17 )  pH, Arterial: 7.29  pH, Blood: x     /  pCO2: 31    /  pO2: 105   / HCO3: 15    / Base Excess: -10.6 /  SaO2: 99.0      RADIOLOGY:        PHYSICAL EXAM:    GENERAL: NAD, lying in bed comfortably  CHEST/LUNG: Clear to auscultation bilaterally; No rales, rhonchi, wheezing, or rubs. Unlabored respirations  HEART: Regular rate and rhythm; No murmurs, rubs, or gallops  ABDOMEN: Bowel sounds present; Soft, Nontender, Nondistended. No hepatomegally  EXTREMITIES:  2+ Peripheral Pulses, brisk capillary refill. No clubbing, cyanosis, or edema  NERVOUS SYSTEM:  Alert & Oriented X3, speech clear. No deficits   MSK: FROM all 4 extremities, full and equal strength  SKIN: No rashes or lesions

## 2023-09-17 LAB
ALBUMIN SERPL ELPH-MCNC: 3.2 G/DL — LOW (ref 3.5–5.2)
ALP SERPL-CCNC: 93 U/L — SIGNIFICANT CHANGE UP (ref 30–115)
ALT FLD-CCNC: 6 U/L — SIGNIFICANT CHANGE UP (ref 0–41)
ANION GAP SERPL CALC-SCNC: 10 MMOL/L — SIGNIFICANT CHANGE UP (ref 7–14)
ANION GAP SERPL CALC-SCNC: 11 MMOL/L — SIGNIFICANT CHANGE UP (ref 7–14)
AST SERPL-CCNC: 12 U/L — SIGNIFICANT CHANGE UP (ref 0–41)
BASOPHILS # BLD AUTO: 0.02 K/UL — SIGNIFICANT CHANGE UP (ref 0–0.2)
BASOPHILS NFR BLD AUTO: 0.3 % — SIGNIFICANT CHANGE UP (ref 0–1)
BILIRUB SERPL-MCNC: 0.2 MG/DL — SIGNIFICANT CHANGE UP (ref 0.2–1.2)
BUN SERPL-MCNC: 44 MG/DL — HIGH (ref 10–20)
BUN SERPL-MCNC: 47 MG/DL — HIGH (ref 10–20)
CALCIUM SERPL-MCNC: 8.1 MG/DL — LOW (ref 8.4–10.4)
CALCIUM SERPL-MCNC: 8.3 MG/DL — LOW (ref 8.4–10.4)
CHLORIDE SERPL-SCNC: 95 MMOL/L — LOW (ref 98–110)
CHLORIDE SERPL-SCNC: 95 MMOL/L — LOW (ref 98–110)
CO2 SERPL-SCNC: 19 MMOL/L — SIGNIFICANT CHANGE UP (ref 17–32)
CO2 SERPL-SCNC: 20 MMOL/L — SIGNIFICANT CHANGE UP (ref 17–32)
CREAT SERPL-MCNC: 4.1 MG/DL — CRITICAL HIGH (ref 0.7–1.5)
CREAT SERPL-MCNC: 4.1 MG/DL — CRITICAL HIGH (ref 0.7–1.5)
EGFR: 12 ML/MIN/1.73M2 — LOW
EGFR: 12 ML/MIN/1.73M2 — LOW
EOSINOPHIL # BLD AUTO: 0.19 K/UL — SIGNIFICANT CHANGE UP (ref 0–0.7)
EOSINOPHIL NFR BLD AUTO: 2.6 % — SIGNIFICANT CHANGE UP (ref 0–8)
GLUCOSE SERPL-MCNC: 107 MG/DL — HIGH (ref 70–99)
GLUCOSE SERPL-MCNC: 130 MG/DL — HIGH (ref 70–99)
HCT VFR BLD CALC: 25.5 % — LOW (ref 37–47)
HGB BLD-MCNC: 8.9 G/DL — LOW (ref 12–16)
IMM GRANULOCYTES NFR BLD AUTO: 0.1 % — SIGNIFICANT CHANGE UP (ref 0.1–0.3)
LYMPHOCYTES # BLD AUTO: 1.93 K/UL — SIGNIFICANT CHANGE UP (ref 1.2–3.4)
LYMPHOCYTES # BLD AUTO: 26.6 % — SIGNIFICANT CHANGE UP (ref 20.5–51.1)
MAGNESIUM SERPL-MCNC: 1.6 MG/DL — LOW (ref 1.8–2.4)
MCHC RBC-ENTMCNC: 29.6 PG — SIGNIFICANT CHANGE UP (ref 27–31)
MCHC RBC-ENTMCNC: 34.9 G/DL — SIGNIFICANT CHANGE UP (ref 32–37)
MCV RBC AUTO: 84.7 FL — SIGNIFICANT CHANGE UP (ref 81–99)
MONOCYTES # BLD AUTO: 0.36 K/UL — SIGNIFICANT CHANGE UP (ref 0.1–0.6)
MONOCYTES NFR BLD AUTO: 5 % — SIGNIFICANT CHANGE UP (ref 1.7–9.3)
NEUTROPHILS # BLD AUTO: 4.74 K/UL — SIGNIFICANT CHANGE UP (ref 1.4–6.5)
NEUTROPHILS NFR BLD AUTO: 65.4 % — SIGNIFICANT CHANGE UP (ref 42.2–75.2)
NRBC # BLD: 0 /100 WBCS — SIGNIFICANT CHANGE UP (ref 0–0)
PHOSPHATE SERPL-MCNC: 4.1 MG/DL — SIGNIFICANT CHANGE UP (ref 2.1–4.9)
PLATELET # BLD AUTO: 216 K/UL — SIGNIFICANT CHANGE UP (ref 130–400)
PMV BLD: 10.4 FL — SIGNIFICANT CHANGE UP (ref 7.4–10.4)
POTASSIUM SERPL-MCNC: 4.5 MMOL/L — SIGNIFICANT CHANGE UP (ref 3.5–5)
POTASSIUM SERPL-MCNC: 5.2 MMOL/L — HIGH (ref 3.5–5)
POTASSIUM SERPL-SCNC: 4.5 MMOL/L — SIGNIFICANT CHANGE UP (ref 3.5–5)
POTASSIUM SERPL-SCNC: 5.2 MMOL/L — HIGH (ref 3.5–5)
PROT SERPL-MCNC: 5.5 G/DL — LOW (ref 6–8)
RBC # BLD: 3.01 M/UL — LOW (ref 4.2–5.4)
RBC # FLD: 11.7 % — SIGNIFICANT CHANGE UP (ref 11.5–14.5)
SODIUM SERPL-SCNC: 125 MMOL/L — LOW (ref 135–146)
SODIUM SERPL-SCNC: 125 MMOL/L — LOW (ref 135–146)
WBC # BLD: 7.25 K/UL — SIGNIFICANT CHANGE UP (ref 4.8–10.8)
WBC # FLD AUTO: 7.25 K/UL — SIGNIFICANT CHANGE UP (ref 4.8–10.8)

## 2023-09-17 PROCEDURE — 76770 US EXAM ABDO BACK WALL COMP: CPT | Mod: 26

## 2023-09-17 PROCEDURE — 99232 SBSQ HOSP IP/OBS MODERATE 35: CPT

## 2023-09-17 RX ORDER — ONDANSETRON 8 MG/1
4 TABLET, FILM COATED ORAL ONCE
Refills: 0 | Status: COMPLETED | OUTPATIENT
Start: 2023-09-17 | End: 2023-09-17

## 2023-09-17 RX ORDER — PREGABALIN 225 MG/1
1000 CAPSULE ORAL DAILY
Refills: 0 | Status: DISCONTINUED | OUTPATIENT
Start: 2023-09-17 | End: 2023-09-19

## 2023-09-17 RX ADMIN — Medication 60 MILLIGRAM(S): at 05:31

## 2023-09-17 RX ADMIN — HEPARIN SODIUM 5000 UNIT(S): 5000 INJECTION INTRAVENOUS; SUBCUTANEOUS at 05:31

## 2023-09-17 RX ADMIN — SIMVASTATIN 10 MILLIGRAM(S): 20 TABLET, FILM COATED ORAL at 21:36

## 2023-09-17 RX ADMIN — SODIUM ZIRCONIUM CYCLOSILICATE 10 GRAM(S): 10 POWDER, FOR SUSPENSION ORAL at 05:31

## 2023-09-17 RX ADMIN — ONDANSETRON 4 MILLIGRAM(S): 8 TABLET, FILM COATED ORAL at 12:16

## 2023-09-17 RX ADMIN — PANTOPRAZOLE SODIUM 40 MILLIGRAM(S): 20 TABLET, DELAYED RELEASE ORAL at 05:31

## 2023-09-17 RX ADMIN — HEPARIN SODIUM 5000 UNIT(S): 5000 INJECTION INTRAVENOUS; SUBCUTANEOUS at 21:36

## 2023-09-17 RX ADMIN — Medication 1300 MILLIGRAM(S): at 05:31

## 2023-09-17 RX ADMIN — Medication 1300 MILLIGRAM(S): at 13:31

## 2023-09-17 RX ADMIN — Medication 1300 MILLIGRAM(S): at 21:36

## 2023-09-17 RX ADMIN — PREGABALIN 1000 MICROGRAM(S): 225 CAPSULE ORAL at 13:32

## 2023-09-17 RX ADMIN — HEPARIN SODIUM 5000 UNIT(S): 5000 INJECTION INTRAVENOUS; SUBCUTANEOUS at 13:31

## 2023-09-17 RX ADMIN — PANTOPRAZOLE SODIUM 40 MILLIGRAM(S): 20 TABLET, DELAYED RELEASE ORAL at 18:07

## 2023-09-17 NOTE — PROGRESS NOTE ADULT - ASSESSMENT
stage 5 ckd ? JOSUE baseline cr in the 2 range / hyperkalemia  cr stable   document UO   check bladder scan   k improved   continue lokelma decrease to 5 g po q12h   ph level noted   IR vein mapping / av access - discussed with daughter reluctant to start RRT  continue sodium bicarbonate  sat elevated  / h/h noted start procrit 5000 units s/c weekly  no acute indication for RRT yet  will follow

## 2023-09-17 NOTE — PROGRESS NOTE ADULT - ASSESSMENT
Patient is a 63-year-old female with past medical history of hypertension, hyperlipidemia, CKD sent in by Dr. Eric Clayton for elevated potassium, creatinine on routine lab work yesterday. Admitted to [facility] for management of hyperkalemia.    #Hyperkalemia  #CKD 5  #HAGMA likely uremia  - Follows with Dr. Dale  - Cr 4.6(3.9 8/23), BUN 58, Co2 16, K+ 6.5 on admission  - ABG - Ph 7.29, pco2 31, hco3 15, k+ 4.7, lactate 0.7  - No ECG changes, normal VS  - s/p insulin d50, ca gluconate 2gm, lokelma 10mg  - sodium bicarb 1300mg tid  - IR for TDC per nephro - F/u with pt's daughter in regarding TDC, initially hesitant regarding catheter  - nephro following  - f/u renal US    #Hyponetremia  - euvolemic on exam  - Na in AM on 125, repeat BMP showed unchanged Na  - will send urine Na and urine osmolality    #Chronic normocytic anemia likely AoCD  - Hb 9.6(8.9 4/22), MCV 91  - iron panel, b12, folate  - agustin per nephro after iron repletion  - Iron studies nl  - B12 deficiency - started on cyanocobalamine daily    #Htn  - c/w nifedipine    #HLD  - c/w simvastatin      DVT - Heparin sq  GI prophylaxis not indicated  Diet - renal diet   AAT   Patient is a 63-year-old female with past medical history of hypertension, hyperlipidemia, CKD sent in by Dr. Eric Clayton for elevated potassium, creatinine on routine lab work yesterday. Admitted to tele for management of hyperkalemia.    #Hyperkalemia  #CKD 5  #HAGMA likely uremia  - Follows with Dr. Dale  - Cr 4.6(3.9 8/23), BUN 58, Co2 16, K+ 6.5 on admission  - ABG - Ph 7.29, pco2 31, hco3 15, k+ 4.7, lactate 0.7  - No ECG changes, normal VS  - s/p insulin d50, ca gluconate 2gm, lokelma 10mg  - sodium bicarb 1300mg tid  - IR for TDC per nephro - pt's daughter initially hesitant for TDC, spoke to pt and the family at bedtime regarding need for TDC and dialysis to which pt's family agreed to getting  - F/u IR regarding TDC in AM  - nephro following  - f/u renal US    #Hyponetremia  - euvolemic on exam  - Na in AM on 125, repeat BMP showed unchanged Na  - will send urine Na and urine osmolality    #Chronic normocytic anemia likely AoCD  - Hb 9.6(8.9 4/22), MCV 91  - iron panel, b12, folate  - agustin per nephro after iron repletion  - Iron studies nl  - B12 deficiency - started on cyanocobalamine daily    #Htn  - c/w nifedipine    #HLD  - c/w simvastatin      DVT - Heparin sq  GI prophylaxis not indicated  Diet - renal diet   AAT

## 2023-09-17 NOTE — PROGRESS NOTE ADULT - SUBJECTIVE AND OBJECTIVE BOX
Nephrology progress note    THIS IS AN INCOMPLETE NOTE . FULL NOTE TO FOLLOW SHORTLY    Patient is seen and examined, events over the last 24 h noted .    Allergies:  No Known Allergies    Hospital Medications:   MEDICATIONS  (STANDING):  heparin   Injectable 5000 Unit(s) SubCutaneous every 8 hours  NIFEdipine XL 60 milliGRAM(s) Oral daily  pantoprazole    Tablet 40 milliGRAM(s) Oral two times a day  simvastatin 10 milliGRAM(s) Oral at bedtime  sodium bicarbonate 1300 milliGRAM(s) Oral three times a day  sodium zirconium cyclosilicate 10 Gram(s) Oral every 12 hours        VITALS:  T(F): 97.5 (09-17-23 @ 04:47), Max: 98.1 (09-16-23 @ 15:55)  HR: 63 (09-17-23 @ 04:47)  BP: 142/84 (09-17-23 @ 04:47)  RR: 18 (09-16-23 @ 20:50)  SpO2: 98% (09-16-23 @ 19:39)  Wt(kg): --    09-16 @ 07:01  -  09-17 @ 07:00  --------------------------------------------------------  IN: 344 mL / OUT: 450 mL / NET: -106 mL      Height (cm): 142.2 (09-16 @ 17:46)  Weight (kg): 52.2 (09-16 @ 17:46)  BMI (kg/m2): 25.8 (09-16 @ 17:46)  BSA (m2): 1.4 (09-16 @ 17:46)    PHYSICAL EXAM:  Constitutional: NAD  HEENT: anicteric sclera, oropharynx clear, MMM  Neck: No JVD  Respiratory: CTAB, no wheezes, rales or rhonchi  Cardiovascular: S1, S2, RRR  Gastrointestinal: BS+, soft, NT/ND  Extremities: No cyanosis or clubbing. No peripheral edema  :  No lane.   Skin: No rashes    LABS:  09-17    125<L>  |  95<L>  |  47<H>  ----------------------------<  107<H>  4.5   |  19  |  4.1<HH>    Ca    8.1<L>      17 Sep 2023 07:28  Phos  4.1     09-17  Mg     1.6     09-17    TPro  5.5<L>  /  Alb  3.2<L>  /  TBili  0.2  /  DBili      /  AST  12  /  ALT  6   /  AlkPhos  93  09-17                          8.9    7.25  )-----------( 216      ( 17 Sep 2023 07:28 )             25.5       Urine Studies:  Urinalysis Basic - ( 17 Sep 2023 07:28 )    Color:  / Appearance:  / SG:  / pH:   Gluc: 107 mg/dL / Ketone:   / Bili:  / Urobili:    Blood:  / Protein:  / Nitrite:    Leuk Esterase:  / RBC:  / WBC    Sq Epi:  / Non Sq Epi:  / Bacteria:           Iron 101, TIBC 233, %sat 43      [09-16-23 @ 08:16]  Ferritin 36      [09-16-23 @ 08:16]    HBsAg Nonreact      [04-15-22 @ 04:30]  HCV 0.07, Nonreact      [04-15-22 @ 04:30]    ALBERTO: titer Negative, pattern --      [04-15-22 @ 04:30]  C3 Complement 127      [04-15-22 @ 04:30]  C4 Complement 31      [04-15-22 @ 04:30]  ANCA: cANCA Negative, pANCA Negative, atypical ANCA Indeterminate Method interference due to ALBERTO Fluorescence      [04-15-22 @ 04:30]  Free Light Chains: kappa 6.54, lambda 4.34, ratio = 1.51      [04-15 @ 04:30]  Immunofixation Serum:   No Monoclonal Band Identified    Reference Range: None Detected      [04-15-22 @ 04:30]  SPEP Interpretation: Normal Electrophoresis Pattern      [04-15-22 @ 04:30]  Immunofixation Urine: Reference Range: None Detected      [04-14-22 @ 19:26]      RADIOLOGY & ADDITIONAL STUDIES:   Nephrology progress note      seen and examined, events over the last 24 h noted .  case discussed with daughter at bedside     Allergies:  No Known Allergies    Hospital Medications:   MEDICATIONS  (STANDING):  heparin   Injectable 5000 Unit(s) SubCutaneous every 8 hours  NIFEdipine XL 60 milliGRAM(s) Oral daily  pantoprazole    Tablet 40 milliGRAM(s) Oral two times a day  simvastatin 10 milliGRAM(s) Oral at bedtime  sodium bicarbonate 1300 milliGRAM(s) Oral three times a day  sodium zirconium cyclosilicate 10 Gram(s) Oral every 12 hours        VITALS:  T(F): 97.5 (09-17-23 @ 04:47), Max: 98.1 (09-16-23 @ 15:55)  HR: 63 (09-17-23 @ 04:47)  BP: 142/84 (09-17-23 @ 04:47)  RR: 18 (09-16-23 @ 20:50)  SpO2: 98% (09-16-23 @ 19:39)      09-16 @ 07:01  -  09-17 @ 07:00  --------------------------------------------------------  IN: 344 mL / OUT: 450 mL / NET: -106 mL      Height (cm): 142.2 (09-16 @ 17:46)  Weight (kg): 52.2 (09-16 @ 17:46)  BMI (kg/m2): 25.8 (09-16 @ 17:46)  BSA (m2): 1.4 (09-16 @ 17:46)    PHYSICAL EXAM:  Constitutional: NAD  Respiratory: CTAB,  Cardiovascular: S1, S2, RRR  Gastrointestinal: BS+, soft, NT/ND  Extremities: No cyanosis or clubbing. No peripheral edema  :  No lane.   Skin: No rashes    LABS:  09-17    125<L>  |  95<L>  |  47<H>  ----------------------------<  107<H>  4.5   |  19  |  4.1<HH>    Ca    8.1<L>      17 Sep 2023 07:28  Phos  4.1     09-17  Mg     1.6     09-17    TPro  5.5<L>  /  Alb  3.2<L>  /  TBili  0.2  /  DBili      /  AST  12  /  ALT  6   /  AlkPhos  93  09-17                          8.9    7.25  )-----------( 216      ( 17 Sep 2023 07:28 )             25.5       Urine Studies:  Urinalysis Basic - ( 17 Sep 2023 07:28 )    Color:  / Appearance:  / SG:  / pH:   Gluc: 107 mg/dL / Ketone:   / Bili:  / Urobili:    Blood:  / Protein:  / Nitrite:    Leuk Esterase:  / RBC:  / WBC    Sq Epi:  / Non Sq Epi:  / Bacteria:           Iron 101, TIBC 233, %sat 43      [09-16-23 @ 08:16]  Ferritin 36      [09-16-23 @ 08:16]    HBsAg Nonreact      [04-15-22 @ 04:30]  HCV 0.07, Nonreact      [04-15-22 @ 04:30]    ALBERTO: titer Negative, pattern --      [04-15-22 @ 04:30]  C3 Complement 127      [04-15-22 @ 04:30]  C4 Complement 31      [04-15-22 @ 04:30]  ANCA: cANCA Negative, pANCA Negative, atypical ANCA Indeterminate Method interference due to ALBERTO Fluorescence      [04-15-22 @ 04:30]  Free Light Chains: kappa 6.54, lambda 4.34, ratio = 1.51      [04-15 @ 04:30]  Immunofixation Serum:   No Monoclonal Band Identified    Reference Range: None Detected      [04-15-22 @ 04:30]  SPEP Interpretation: Normal Electrophoresis Pattern      [04-15-22 @ 04:30]  Immunofixation Urine: Reference Range: None Detected      [04-14-22 @ 19:26]      RADIOLOGY & ADDITIONAL STUDIES:

## 2023-09-17 NOTE — PROGRESS NOTE ADULT - SUBJECTIVE AND OBJECTIVE BOX
NARESH MAHMOOD  63y  Female      Patient is a 63y old  Female who presents with a chief complaint of abnormal op labs (17 Sep 2023 09:24)      INTERVAL HPI/OVERNIGHT EVENTS:  Patient feels ok, no abdominal pain or urinary symptoms.   Vital Signs Last 24 Hrs  T(C): 37.4 (17 Sep 2023 12:55), Max: 37.4 (17 Sep 2023 12:55)  T(F): 99.4 (17 Sep 2023 12:55), Max: 99.4 (17 Sep 2023 12:55)  HR: 70 (17 Sep 2023 12:55) (58 - 76)  BP: 145/84 (17 Sep 2023 12:55) (126/78 - 169/87)  BP(mean): --  RR: 18 (17 Sep 2023 12:55) (18 - 18)  SpO2: 98% (16 Sep 2023 19:39) (98% - 99%)    Parameters below as of 16 Sep 2023 19:39  Patient On (Oxygen Delivery Method): room air          09-16-23 @ 07:01  -  09-17-23 @ 07:00  --------------------------------------------------------  IN: 344 mL / OUT: 450 mL / NET: -106 mL    09-17-23 @ 07:01  -  09-17-23 @ 13:02  --------------------------------------------------------  IN: 149 mL / OUT: 0 mL / NET: 149 mL            Consultant(s) Notes Reviewed:  [x ] YES  [ ] NO          MEDICATIONS  (STANDING):  cyanocobalamin 1000 MICROGram(s) Oral daily  heparin   Injectable 5000 Unit(s) SubCutaneous every 8 hours  NIFEdipine XL 60 milliGRAM(s) Oral daily  pantoprazole    Tablet 40 milliGRAM(s) Oral two times a day  simvastatin 10 milliGRAM(s) Oral at bedtime  sodium bicarbonate 1300 milliGRAM(s) Oral three times a day  sodium zirconium cyclosilicate 10 Gram(s) Oral every 12 hours    MEDICATIONS  (PRN):      LABS                          8.9    7.25  )-----------( 216      ( 17 Sep 2023 07:28 )             25.5     09-17    125<L>  |  95<L>  |  47<H>  ----------------------------<  107<H>  4.5   |  19  |  4.1<HH>    Ca    8.1<L>      17 Sep 2023 07:28  Phos  4.1     09-17  Mg     1.6     09-17    TPro  5.5<L>  /  Alb  3.2<L>  /  TBili  0.2  /  DBili  x   /  AST  12  /  ALT  6   /  AlkPhos  93  09-17    ABG - ( 15 Sep 2023 21:17 )  pH, Arterial: 7.29  pH, Blood: x     /  pCO2: 31    /  pO2: 105   / HCO3: 15    / Base Excess: -10.6 /  SaO2: 99.0              Urinalysis Basic - ( 17 Sep 2023 07:28 )    Color: x / Appearance: x / SG: x / pH: x  Gluc: 107 mg/dL / Ketone: x  / Bili: x / Urobili: x   Blood: x / Protein: x / Nitrite: x   Leuk Esterase: x / RBC: x / WBC x   Sq Epi: x / Non Sq Epi: x / Bacteria: x      PT/INR - ( 16 Sep 2023 11:05 )   PT: 9.80 sec;   INR: 0.86 ratio         PTT - ( 16 Sep 2023 11:05 )  PTT:33.4 sec  Lactate Trend        CAPILLARY BLOOD GLUCOSE      POCT Blood Glucose.: 122 mg/dL (16 Sep 2023 17:05)      Culture - Urine (collected 09-15-23 @ 19:17)  Source: Clean Catch Clean Catch (Midstream)  Final Report (09-16-23 @ 23:50):    No growth        RADIOLOGY & ADDITIONAL TESTS:    Imaging Personally Reviewed:  [ ] YES  [ ] NO    HEALTH ISSUES - PROBLEM Dx:          PHYSICAL EXAM:  GENERAL: NAD, well-developed.  HEAD:  Atraumatic, Normocephalic.  EYES: EOMI, PERRLA, conjunctiva and sclera clear.  NECK: Supple, No JVD.  CHEST/LUNG: Clear to auscultation bilaterally; No wheeze.  HEART: Regular rate and rhythm; S1 S2.   ABDOMEN: Soft, Nontender, Nondistended; Bowel sounds present.  EXTREMITIES:  2+ Peripheral Pulses, No clubbing, cyanosis, or edema.  PSYCH: AAOx3.  NEUROLOGY: non-focal.  SKIN: No rashes or lesions.

## 2023-09-17 NOTE — PROGRESS NOTE ADULT - SUBJECTIVE AND OBJECTIVE BOX
24H events:    Patient is a 63y old Female who presents with a chief complaint of abnormal op labs (17 Sep 2023 12:52)    Primary diagnosis of Serum potassium elevated      Today is hospital day 2d. This morning patient was seen and examined at bedside, resting comfortably in bed.  No acute or major events overnight.      PAST MEDICAL & SURGICAL HISTORY  Hypertension    Hyperlipemia    No significant past surgical history      SOCIAL HISTORY:  Social History:      ALLERGIES:  No Known Allergies    MEDICATIONS:  STANDING MEDICATIONS  cyanocobalamin 1000 MICROGram(s) Oral daily  heparin   Injectable 5000 Unit(s) SubCutaneous every 8 hours  NIFEdipine XL 60 milliGRAM(s) Oral daily  pantoprazole    Tablet 40 milliGRAM(s) Oral two times a day  simvastatin 10 milliGRAM(s) Oral at bedtime  sodium bicarbonate 1300 milliGRAM(s) Oral three times a day  sodium zirconium cyclosilicate 10 Gram(s) Oral every 12 hours    PRN MEDICATIONS    VITALS:   T(F): 99.4  HR: 70  BP: 145/84  RR: 18  SpO2: 98%    PHYSICAL EXAM:  GENERAL:   ( x ) NAD, lying in bed comfortably     (  ) obtunded     (  ) lethargic     (  ) somnolent    HEAD:   ( x ) Atraumatic     (  ) hematoma     (  ) laceration (specify location:       )     NECK:  ( x ) Supple     (  ) neck stiffness     (  ) nuchal rigidity     (  )  no JVD     (  ) JVD present ( -- cm)    HEART:  Rate -->     ( x ) normal rate     (  ) bradycardic     (  ) tachycardic  Rhythm -->     (  ) regular     (  ) regularly irregular     (  ) irregularly irregular  Murmurs -->     (  ) normal s1s2     (  ) systolic murmur     (  ) diastolic murmur     (  ) continuous murmur      (  ) S3 present     (  ) S4 present    LUNGS:   ( x )Unlabored respirations     (  ) tachypnea  (  ) B/L air entry     (  ) decreased breath sounds in:  (location     )    (  ) no adventitious sound     (  ) crackles     (  ) wheezing      (  ) rhonchi      (specify location:       )  (  ) chest wall tenderness (specify location:       )    ABDOMEN:   ( x ) Soft     (  ) tense   |   (  ) nondistended     (  ) distended   |   (  ) +BS     (  ) hypoactive bowel sounds     (  ) hyperactive bowel sounds  (  ) nontender     (  ) RUQ tenderness     (  ) RLQ tenderness     (  ) LLQ tenderness     (  ) epigastric tenderness     (  ) diffuse tenderness  (  ) Splenomegaly      (  ) Hepatomegaly      (  ) Jaundice     (  ) ecchymosis     EXTREMITIES:  ( x ) Normal     (  ) Rash     (  ) ecchymosis     (  ) varicose veins      (  ) pitting edema     (  ) non-pitting edema   (  ) ulceration     (  ) gangrene:     (location:     )    NERVOUS SYSTEM:    ( x ) A&Ox3     (  ) confused     (  ) lethargic  CN II-XII:     (  ) Intact     (  ) deficits found     (Specify:     )   Upper extremities:     (  ) no sensorimotor deficits     (  ) weakness     (  ) loss of proprioception/vibration     (  ) loss of touch/temperature (specify:    )  Lower extremities:     (  ) no sensorimotor deficits     (  ) weakness     (  ) loss of proprioception/vibration     (  ) loss of touch/temperature (specify:    )    SKIN:   ( x ) No rashes or lesions     (  ) maculopapular rash     (  ) pustules     (  ) vesicles     (  ) ulcer     (  ) ecchymosis     (specify location:     )      LABS:                        8.9    7.25  )-----------( 216      ( 17 Sep 2023 07:28 )             25.5     09-17    125<L>  |  95<L>  |  44<H>  ----------------------------<  130<H>  5.2<H>   |  20  |  4.1<HH>    Ca    8.3<L>      17 Sep 2023 16:44  Phos  4.1     09-17  Mg     1.6     09-17    TPro  5.5<L>  /  Alb  3.2<L>  /  TBili  0.2  /  DBili  x   /  AST  12  /  ALT  6   /  AlkPhos  93  09-17    PT/INR - ( 16 Sep 2023 11:05 )   PT: 9.80 sec;   INR: 0.86 ratio         PTT - ( 16 Sep 2023 11:05 )  PTT:33.4 sec  Urinalysis Basic - ( 17 Sep 2023 16:44 )    Color: x / Appearance: x / SG: x / pH: x  Gluc: 130 mg/dL / Ketone: x  / Bili: x / Urobili: x   Blood: x / Protein: x / Nitrite: x   Leuk Esterase: x / RBC: x / WBC x   Sq Epi: x / Non Sq Epi: x / Bacteria: x      ABG - ( 15 Sep 2023 21:17 )  pH, Arterial: 7.29  pH, Blood: x     /  pCO2: 31    /  pO2: 105   / HCO3: 15    / Base Excess: -10.6 /  SaO2: 99.0                  Culture - Urine (collected 15 Sep 2023 19:17)  Source: Clean Catch Clean Catch (Midstream)  Final Report (16 Sep 2023 23:50):    No growth Recommended observation.

## 2023-09-17 NOTE — PROGRESS NOTE ADULT - ASSESSMENT
63-year-old female with past medical history of hypertension, hyperlipidemia, CKD sent in by Dr. Eric Clayton for elevated potassium, creatinine on routine lab work yesterday. Admitted to tele for management of hyperkalemia.    A/P:   Acute Kidney Injury on CKD 4:   Hyperkalemia:   High AG metabolic Acidosis:   Cr is improving 4.1 from 4.6, baseline 2.2 in 2022.   K level improved, s/p Lokelma.   Phos level 4.1 reviewed, continue Sodium bicarb 1300mg TID  nephrology follow up.   Pending renal US.    Hyponatremia:   Patient is euvolemic, repeat Na, if low, will send urine Na, urine Osmol.     Chronic normocytic anemia likely from CKD.   B12 deficiency:   Hb 9, iron studies: normal  B12 235, low   Start on Cyanocobalamine 1000mcg daily.     HTN: Continue Nifedipine 60mg po daily.   HLD: on Simvastatin.     DVT prophylaxis: Heparin SC.   #Progress Note Handoff:  Pending (specify):  improving Acute Kidney Injury,   Family discussion: with her daughter.   Disposition: Home.

## 2023-09-18 LAB
ALBUMIN SERPL ELPH-MCNC: 4 G/DL — SIGNIFICANT CHANGE UP (ref 3.5–5.2)
ALP SERPL-CCNC: 96 U/L — SIGNIFICANT CHANGE UP (ref 30–115)
ALT FLD-CCNC: 6 U/L — SIGNIFICANT CHANGE UP (ref 0–41)
ANION GAP SERPL CALC-SCNC: 10 MMOL/L — SIGNIFICANT CHANGE UP (ref 7–14)
AST SERPL-CCNC: 14 U/L — SIGNIFICANT CHANGE UP (ref 0–41)
BASOPHILS # BLD AUTO: 0.02 K/UL — SIGNIFICANT CHANGE UP (ref 0–0.2)
BASOPHILS NFR BLD AUTO: 0.3 % — SIGNIFICANT CHANGE UP (ref 0–1)
BILIRUB SERPL-MCNC: 0.4 MG/DL — SIGNIFICANT CHANGE UP (ref 0.2–1.2)
BUN SERPL-MCNC: 43 MG/DL — HIGH (ref 10–20)
CALCIUM SERPL-MCNC: 8.7 MG/DL — SIGNIFICANT CHANGE UP (ref 8.4–10.5)
CHLORIDE SERPL-SCNC: 97 MMOL/L — LOW (ref 98–110)
CO2 SERPL-SCNC: 23 MMOL/L — SIGNIFICANT CHANGE UP (ref 17–32)
CREAT SERPL-MCNC: 4.1 MG/DL — CRITICAL HIGH (ref 0.7–1.5)
EGFR: 12 ML/MIN/1.73M2 — LOW
EOSINOPHIL # BLD AUTO: 0.14 K/UL — SIGNIFICANT CHANGE UP (ref 0–0.7)
EOSINOPHIL NFR BLD AUTO: 1.9 % — SIGNIFICANT CHANGE UP (ref 0–8)
GLUCOSE SERPL-MCNC: 100 MG/DL — HIGH (ref 70–99)
HCT VFR BLD CALC: 27.3 % — LOW (ref 37–47)
HGB BLD-MCNC: 9.6 G/DL — LOW (ref 12–16)
IMM GRANULOCYTES NFR BLD AUTO: 0.4 % — HIGH (ref 0.1–0.3)
LYMPHOCYTES # BLD AUTO: 1.74 K/UL — SIGNIFICANT CHANGE UP (ref 1.2–3.4)
LYMPHOCYTES # BLD AUTO: 23.7 % — SIGNIFICANT CHANGE UP (ref 20.5–51.1)
MAGNESIUM SERPL-MCNC: 1.7 MG/DL — LOW (ref 1.8–2.4)
MCHC RBC-ENTMCNC: 30 PG — SIGNIFICANT CHANGE UP (ref 27–31)
MCHC RBC-ENTMCNC: 35.2 G/DL — SIGNIFICANT CHANGE UP (ref 32–37)
MCV RBC AUTO: 85.3 FL — SIGNIFICANT CHANGE UP (ref 81–99)
MONOCYTES # BLD AUTO: 0.4 K/UL — SIGNIFICANT CHANGE UP (ref 0.1–0.6)
MONOCYTES NFR BLD AUTO: 5.5 % — SIGNIFICANT CHANGE UP (ref 1.7–9.3)
NEUTROPHILS # BLD AUTO: 5 K/UL — SIGNIFICANT CHANGE UP (ref 1.4–6.5)
NEUTROPHILS NFR BLD AUTO: 68.2 % — SIGNIFICANT CHANGE UP (ref 42.2–75.2)
NRBC # BLD: 0 /100 WBCS — SIGNIFICANT CHANGE UP (ref 0–0)
PHOSPHATE SERPL-MCNC: 4.3 MG/DL — SIGNIFICANT CHANGE UP (ref 2.1–4.9)
PLATELET # BLD AUTO: 236 K/UL — SIGNIFICANT CHANGE UP (ref 130–400)
PMV BLD: 10.7 FL — HIGH (ref 7.4–10.4)
POTASSIUM SERPL-MCNC: 5 MMOL/L — SIGNIFICANT CHANGE UP (ref 3.5–5)
POTASSIUM SERPL-SCNC: 5 MMOL/L — SIGNIFICANT CHANGE UP (ref 3.5–5)
PROT SERPL-MCNC: 6.1 G/DL — SIGNIFICANT CHANGE UP (ref 6–8)
RBC # BLD: 3.2 M/UL — LOW (ref 4.2–5.4)
RBC # FLD: 11.7 % — SIGNIFICANT CHANGE UP (ref 11.5–14.5)
SODIUM SERPL-SCNC: 130 MMOL/L — LOW (ref 135–146)
WBC # BLD: 7.33 K/UL — SIGNIFICANT CHANGE UP (ref 4.8–10.8)
WBC # FLD AUTO: 7.33 K/UL — SIGNIFICANT CHANGE UP (ref 4.8–10.8)

## 2023-09-18 PROCEDURE — 99232 SBSQ HOSP IP/OBS MODERATE 35: CPT

## 2023-09-18 PROCEDURE — 93971 EXTREMITY STUDY: CPT | Mod: 26,LT

## 2023-09-18 RX ORDER — ONDANSETRON 8 MG/1
4 TABLET, FILM COATED ORAL ONCE
Refills: 0 | Status: COMPLETED | OUTPATIENT
Start: 2023-09-18 | End: 2023-09-18

## 2023-09-18 RX ORDER — ALPRAZOLAM 0.25 MG
0.25 TABLET ORAL DAILY
Refills: 0 | Status: DISCONTINUED | OUTPATIENT
Start: 2023-09-18 | End: 2023-09-19

## 2023-09-18 RX ADMIN — Medication 1300 MILLIGRAM(S): at 05:32

## 2023-09-18 RX ADMIN — HEPARIN SODIUM 5000 UNIT(S): 5000 INJECTION INTRAVENOUS; SUBCUTANEOUS at 13:18

## 2023-09-18 RX ADMIN — PANTOPRAZOLE SODIUM 40 MILLIGRAM(S): 20 TABLET, DELAYED RELEASE ORAL at 05:33

## 2023-09-18 RX ADMIN — PANTOPRAZOLE SODIUM 40 MILLIGRAM(S): 20 TABLET, DELAYED RELEASE ORAL at 17:10

## 2023-09-18 RX ADMIN — SIMVASTATIN 10 MILLIGRAM(S): 20 TABLET, FILM COATED ORAL at 21:39

## 2023-09-18 RX ADMIN — ONDANSETRON 4 MILLIGRAM(S): 8 TABLET, FILM COATED ORAL at 09:44

## 2023-09-18 RX ADMIN — Medication 1300 MILLIGRAM(S): at 21:38

## 2023-09-18 RX ADMIN — PREGABALIN 1000 MICROGRAM(S): 225 CAPSULE ORAL at 11:53

## 2023-09-18 RX ADMIN — Medication 60 MILLIGRAM(S): at 05:32

## 2023-09-18 RX ADMIN — HEPARIN SODIUM 5000 UNIT(S): 5000 INJECTION INTRAVENOUS; SUBCUTANEOUS at 21:39

## 2023-09-18 RX ADMIN — SODIUM ZIRCONIUM CYCLOSILICATE 10 GRAM(S): 10 POWDER, FOR SUSPENSION ORAL at 17:10

## 2023-09-18 RX ADMIN — HEPARIN SODIUM 5000 UNIT(S): 5000 INJECTION INTRAVENOUS; SUBCUTANEOUS at 05:32

## 2023-09-18 RX ADMIN — SODIUM ZIRCONIUM CYCLOSILICATE 10 GRAM(S): 10 POWDER, FOR SUSPENSION ORAL at 09:44

## 2023-09-18 RX ADMIN — Medication 1300 MILLIGRAM(S): at 13:18

## 2023-09-18 NOTE — PROGRESS NOTE ADULT - TIME BILLING
direct pt care and interdisciplinary rounds   f/u vein mapping and BP levels if cleared will dc home in am

## 2023-09-18 NOTE — CONSULT NOTE ADULT - SUBJECTIVE AND OBJECTIVE BOX
Patient is a 63-year-old female with past medical history of hypertension, hyperlipidemia, CKD sent in  for elevated potassium, creatinine on routine lab work     PAST HISTORY  --------------------------------------------------------------------------------  PAST MEDICAL & SURGICAL HISTORY:  Hypertension      Hyperlipemia      No significant past surgical history        FAMILY HISTORY:    PAST SOCIAL HISTORY:    ALLERGIES & MEDICATIONS  --------------------------------------------------------------------------------  Allergies    No Known Allergies    Intolerances      Standing Inpatient Medications  heparin   Injectable 5000 Unit(s) SubCutaneous every 8 hours  NIFEdipine XL 60 milliGRAM(s) Oral daily  simvastatin 10 milliGRAM(s) Oral at bedtime  sodium bicarbonate 1300 milliGRAM(s) Oral three times a day  sodium zirconium cyclosilicate 10 Gram(s) Oral every 12 hours          VITALS/PHYSICAL EXAM  --------------------------------------------------------------------------------  T(C): 36.8 (09-16-23 @ 07:35), Max: 36.8 (09-16-23 @ 05:05)  HR: 66 (09-16-23 @ 07:35) (57 - 66)  BP: 110/70 (09-16-23 @ 07:35) (110/70 - 153/80)  RR: 18 (09-16-23 @ 07:35) (16 - 18)  SpO2: 100% (09-16-23 @ 07:35) (99% - 100%)  Wt(kg): --        Physical Exam:  	Gen: NAD  	Pulm: CTA B/L  	CV: S1S2; no rub  	Abd: +BS, soft, nontender/nondistended  	LE:  no edema      LABS/STUDIES  --------------------------------------------------------------------------------              9.0    7.88  >-----------<  205      [09-16-23 @ 08:16]              26.6     136  |  107  |  55  ----------------------------<  161      [09-16-23 @ 08:16]  4.9   |  16  |  4.6        Ca     8.8     [09-16-23 @ 08:16]      Mg     1.9     [09-16-23 @ 08:16]    TPro  5.8  /  Alb  3.3  /  TBili  <0.2  /  DBili  x   /  AST  11  /  ALT  6   /  AlkPhos  95  [09-16-23 @ 08:16]    PT/INR: PT 9.80 , INR 0.86       [09-16-23 @ 11:05]  PTT: 33.4       [09-16-23 @ 11:05]      Creatinine Trend:  SCr 4.6 [09-16 @ 08:16]  SCr 4.6 [09-15 @ 17:49]    Urinalysis - [09-16-23 @ 08:16]      Color  / Appearance  / SG  / pH       Gluc 161 / Ketone   / Bili  / Urobili        Blood  / Protein  / Leuk Est  / Nitrite       RBC  / WBC  / Hyaline  / Gran  / Sq Epi  / Non Sq Epi  / Bacteria       Iron 101, TIBC 233, %sat 43      [09-16-23 @ 08:16]    HBsAg Nonreact      [04-15-22 @ 04:30]  HCV 0.07, Nonreact      [04-15-22 @ 04:30]    ALBERTO: titer Negative, pattern --      [04-15-22 @ 04:30]  C3 Complement 127      [04-15-22 @ 04:30]  C4 Complement 31      [04-15-22 @ 04:30]  ANCA: cANCA Negative, pANCA Negative, atypical ANCA Indeterminate Method interference due to ALBERTO Fluorescence      [04-15-22 @ 04:30]  Free Light Chains: kappa 6.54, lambda 4.34, ratio = 1.51      [04-15 @ 04:30]  Immunofixation Serum:   No Monoclonal Band Identified    Reference Range: None Detected      [04-15-22 @ 04:30]  SPEP Interpretation: Normal Electrophoresis Pattern      [04-15-22 @ 04:30]  Immunofixation Urine: Reference Range: None Detected      [04-14-22 @ 19:26]  
INTERVENTIONAL RADIOLOGY CONSULT:     HPI:  Patient is a 63-year-old female with past medical history of hypertension, hyperlipidemia, CKD sent in by Dr. Eric Clayton for elevated potassium, creatinine on routine lab work yesterday. Patient follows with Dr. Eric Clayton for nephrology, has been having monthly blood draws recently for worsening kidney function.  Patient denies fever, chills, change in p.o. intake, endorses normal urinary output.  Denies urinary symptoms.  Denies abdominal pain, diarrhea.  Denies headache.  Patient says she feels at baseline, otherwise well.    PAST MEDICAL & SURGICAL HISTORY:  Hypertension  Hyperlipemia    No significant past surgical history    MEDICATIONS  (STANDING):  cyanocobalamin 1000 MICROGram(s) Oral daily  heparin   Injectable 5000 Unit(s) SubCutaneous every 8 hours  NIFEdipine XL 60 milliGRAM(s) Oral daily  ondansetron Injectable 4 milliGRAM(s) IV Push once  pantoprazole    Tablet 40 milliGRAM(s) Oral two times a day  simvastatin 10 milliGRAM(s) Oral at bedtime  sodium bicarbonate 1300 milliGRAM(s) Oral three times a day  sodium zirconium cyclosilicate 10 Gram(s) Oral every 12 hours    MEDICATIONS  (PRN):  Allergies  No Known Allergies    Intolerances    Physical Exam:   Vital Signs Last 24 Hrs  T(C): 36.7 (18 Sep 2023 05:13), Max: 37.4 (17 Sep 2023 12:55)  T(F): 98 (18 Sep 2023 05:13), Max: 99.4 (17 Sep 2023 12:55)  HR: 62 (18 Sep 2023 05:13) (60 - 70)  BP: 147/85 (18 Sep 2023 05:13) (145/84 - 191/90)  BP(mean): --  RR: 18 (18 Sep 2023 05:13) (18 - 18)  SpO2: 100% (18 Sep 2023 05:13) (98% - 100%)    Parameters below as of 18 Sep 2023 05:13  Patient On (Oxygen Delivery Method): room air    Labs:                         8.9    7.25  )-----------( 216      ( 17 Sep 2023 07:28 )             25.5     09-17    125<L>  |  95<L>  |  44<H>  ----------------------------<  130<H>  5.2<H>   |  20  |  4.1<HH>    Ca    8.3<L>      17 Sep 2023 16:44  Phos  4.1     09-17  Mg     1.6     09-17    TPro  5.5<L>  /  Alb  3.2<L>  /  TBili  0.2  /  DBili  x   /  AST  12  /  ALT  6   /  AlkPhos  93  09-17    PT/INR - ( 16 Sep 2023 11:05 )   PT: 9.80 sec;   INR: 0.86 ratio         PTT - ( 16 Sep 2023 11:05 )  PTT:33.4 sec    Pertinent labs:                      8.9    7.25  )-----------( 216      ( 17 Sep 2023 07:28 )             25.5       09-17    125<L>  |  95<L>  |  44<H>  ----------------------------<  130<H>  5.2<H>   |  20  |  4.1<HH>    Ca    8.3<L>      17 Sep 2023 16:44  Phos  4.1     09-17  Mg     1.6     09-17    TPro  5.5<L>  /  Alb  3.2<L>  /  TBili  0.2  /  DBili  x   /  AST  12  /  ALT  6   /  AlkPhos  93  09-17  PT/INR - ( 16 Sep 2023 11:05 )   PT: 9.80 sec;   INR: 0.86 ratio    PTT - ( 16 Sep 2023 11:05 )  PTT:33.4 sec    Radiology imaging: reviewed.     ASSESSMENT/PLAN:     CKD with noted hyperkalemia:  - Per nephrology note, patient will not need TDC placement if K < 5.5 with K = 5.2 this am.  Also, if still needed, daughter will need to consent. No plan for IR TDC placement given.   - IR can offer vein mapping if needed, please communicate with IR if this is the plan.     M-F 8am - 5pm: [phone]  Other times including weekend: [phone]    Thank you for the courtesy of this consult.

## 2023-09-18 NOTE — PROGRESS NOTE ADULT - ASSESSMENT
Patient is a 63-year-old female with past medical history of hypertension, hyperlipidemia, CKD sent in by Dr. Eric Clayton for elevated potassium, creatinine on routine lab work yesterday. Admitted to tele for management of hyperkalemia.    #Hyperkalemia  #CKD 5  #HAGMA likely uremia  - Follows with Dr. Dale  - Cr 4.6(3.9 8/23), BUN 58, Co2 16, K+ 6.5 on admission  - ABG - Ph 7.29, pco2 31, hco3 15, k+ 4.7, lactate 0.7  - No ECG changes, normal VS  - s/p insulin d50, ca gluconate 2gm, lokelma 10mg  - sodium bicarb 1300mg tid  - IR for TDC per nephro - pt's daughter initially hesitant for TDC, spoke to pt and the family at bedtime regarding need for TDC and dialysis to which pt's family agreed to getting  - F/u IR regarding TDC in AM  - nephro following  - f/u renal US    #Hyponetremia  - euvolemic on exam  - Na in AM on 125, repeat BMP showed unchanged Na  - will send urine Na and urine osmolality    #Chronic normocytic anemia likely AoCD  - Hb 9.6(8.9 4/22), MCV 91  - iron panel, b12, folate  - agustin per nephro after iron repletion  - Iron studies nl  - B12 deficiency - started on cyanocobalamine daily    #Htn  - c/w nifedipine    #HLD  - c/w simvastatin      DVT - Heparin sq  GI prophylaxis not indicated  Diet - renal diet   AAT   Patient is a 63-year-old female with past medical history of hypertension, hyperlipidemia, CKD sent in by Dr. Eric Clayton for elevated potassium, creatinine on routine lab work yesterday. Admitted to tele for management of hyperkalemia.    #Hyperkalemia  #CKD 5  #HAGMA likely uremia  - Follows with Dr. Dale, was sent to the ED for abnormal labwork (Cr 4.6, K+ 6.5, BUN 58)  - In the ED, s/p insulin d50, ca gluconate 2gm, lokelma 10mg  - EKG normal  - K 5.0, Cr 4.1, BUN 43, Mg 1.7, Phosphorus 4.3  - IR consulted for possible TDC per nephro - pt's daughter initially hesitant for TDC, spoke to pt and the family at bedtime regarding need for TDC and dialysis to which pt's family agreed to getting  - IR consult appreciated; at this time, patient is not indicated for TDC with K+ < 5.5. IR can do vein mapping if requested.  - nephro following  - RBUS shows some echogenic parenchyma in the setting of CKD, and no hydronephrosis  - c/w sodium bicarb 1300mg TID, lokelma 10 mg BID,     #Hyponetremia  - euvolemic on exam  - Na 130  - Continue to monitor    #Chronic normocytic anemia likely AoCD  - Hb 9.6(8.9 4/22), MCV 91  - iron panel, b12, folate  - agustin per nephro after iron repletion  - Iron studies nl  - B12 deficiency - c/w cyanocobalamin    #Htn  - c/w nifedipine    #HLD  - c/w simvastatin      DVT - Heparin sq  GI prophylaxis not indicated  Diet - renal diet   AAT

## 2023-09-18 NOTE — PROGRESS NOTE ADULT - SUBJECTIVE AND OBJECTIVE BOX
seen and examined  24 hevents noted   no distress   lying comfortable         PAST HISTORY  --------------------------------------------------------------------------------  No significant changes to PMH, PSH, FHx, SHx, unless otherwise noted    ALLERGIES & MEDICATIONS  --------------------------------------------------------------------------------  Allergies    No Known Allergies    Intolerances      Standing Inpatient Medications  cyanocobalamin 1000 MICROGram(s) Oral daily  heparin   Injectable 5000 Unit(s) SubCutaneous every 8 hours  NIFEdipine XL 60 milliGRAM(s) Oral daily  pantoprazole    Tablet 40 milliGRAM(s) Oral two times a day  simvastatin 10 milliGRAM(s) Oral at bedtime  sodium bicarbonate 1300 milliGRAM(s) Oral three times a day  sodium zirconium cyclosilicate 10 Gram(s) Oral every 12 hours          VITALS/PHYSICAL EXAM  --------------------------------------------------------------------------------  T(C): 36.7 (09-18-23 @ 05:13), Max: 37.4 (09-17-23 @ 12:55)  HR: 62 (09-18-23 @ 05:13) (60 - 70)  BP: 147/85 (09-18-23 @ 05:13) (145/84 - 191/90)  RR: 18 (09-18-23 @ 05:13) (18 - 18)  SpO2: 100% (09-18-23 @ 05:13) (98% - 100%)  Wt(kg): --  Height (cm): 142.2 (09-16-23 @ 17:46)  Weight (kg): 52.2 (09-16-23 @ 17:46)  BMI (kg/m2): 25.8 (09-16-23 @ 17:46)  BSA (m2): 1.4 (09-16-23 @ 17:46)      09-16-23 @ 07:01  -  09-17-23 @ 07:00  --------------------------------------------------------  IN: 344 mL / OUT: 450 mL / NET: -106 mL    09-17-23 @ 07:01  -  09-18-23 @ 06:12  --------------------------------------------------------  IN: 523 mL / OUT: 0 mL / NET: 523 mL      Physical Exam:  	Gen: NAD,  	Pulm: CTA B/L  	CV:  S1S2; no rub  	Abd: soft, nontender/nondistended  	LE: no edema  	    LABS/STUDIES  --------------------------------------------------------------------------------              8.9    7.25  >-----------<  216      [09-17-23 @ 07:28]              25.5     125  |  95  |  44  ----------------------------<  130      [09-17-23 @ 16:44]  5.2   |  20  |  4.1        Ca     8.3     [09-17-23 @ 16:44]      Mg     1.6     [09-17-23 @ 07:28]      Phos  4.1     [09-17-23 @ 07:28]    TPro  5.5  /  Alb  3.2  /  TBili  0.2  /  DBili  x   /  AST  12  /  ALT  6   /  AlkPhos  93  [09-17-23 @ 07:28]    PT/INR: PT 9.80 , INR 0.86       [09-16-23 @ 11:05]  PTT: 33.4       [09-16-23 @ 11:05]      Creatinine Trend:  SCr 4.1 [09-17 @ 16:44]  SCr 4.1 [09-17 @ 07:28]  SCr 4.6 [09-16 @ 08:16]  SCr 4.6 [09-15 @ 17:49]    Urinalysis - [09-17-23 @ 16:44]      Color  / Appearance  / SG  / pH       Gluc 130 / Ketone   / Bili  / Urobili        Blood  / Protein  / Leuk Est  / Nitrite       RBC  / WBC  / Hyaline  / Gran  / Sq Epi  / Non Sq Epi  / Bacteria       Iron 101, TIBC 233, %sat 43      [09-16-23 @ 08:16]  Ferritin 36      [09-16-23 @ 08:16]

## 2023-09-18 NOTE — PROGRESS NOTE ADULT - ASSESSMENT
stage 5 ckd ? JOSUE baseline cr in the 2 range / hyperkalemia  cr stable   k  noted / continue lokelma / if repeated k < 5.5 do not place TDC / check with IR for vein mapping   ph level noted / no binders   sodium level stable/ check plasma osm/ u osm / U sodium / TSH   continue sodium bicarbonate  sat elevated  / h/h noted start procrit 5000 units s/c weekly  BP better controlled this morning   no acute indication for RRT yet  will follow

## 2023-09-18 NOTE — PROGRESS NOTE ADULT - ATTENDING COMMENTS
63-year-old female with past medical history of hypertension, hyperlipidemia, CKD sent in by Dr. Eric Clayton for elevated potassium, creatinine on routine lab work yesterday. Admitted to tele for management of hyperkalemia.    Acute Kidney Injury on CKD 4:   Hyperkalemia:   High AG metabolic Acidosis:   Cr is improving 4.1 from 4.6, baseline 2.2 in 2022.   K level improved, s/p Lokelma.   Phos level 4.1 reviewed, continue Sodium bicarb 1300mg TID  Renal Sono reviewed  DISCUSSE OPTION OF RENAL TRANSPLANT AND NEED TO BE REFERRED TO TRANSPLANT CENTER F/U NEPHROLOGY   AT THIS TIME PT DOES NOT WANT HD  PER NEPHRO - GET VEIN MAPPING BY IR THEN WILL D/C HOME TOMORROW     Hyponatremia:   Patient is euvolemic  Likely from Low Salt Diet for HTN     Chronic normocytic anemia likely from CKD   B12 deficiency:   Hb 9, iron studies: normal  B12 235, low   Start on Cyanocobalamine 1000mcg daily    HTN: Continue Nifedipine 60mg po daily  HLD: on Simvastatin.     DVT prophylaxis: Heparin SC.     #Progress Note Handoff:  Pending (specify):  improving Acute Kidney Injury,   Family discussion: with her daughter at bedside this am and    Disposition: Home.

## 2023-09-18 NOTE — PROGRESS NOTE ADULT - SUBJECTIVE AND OBJECTIVE BOX
24H events:    Patient is a 63y old Female who presents with a chief complaint of abnormal op labs (18 Sep 2023 06:12)    Primary diagnosis of Serum potassium elevated       Today is hospital day 3d.      PAST MEDICAL & SURGICAL HISTORY  Hypertension    Hyperlipemia    No significant past surgical history      SOCIAL HISTORY:  Negative for smoking/alcohol/drug use.     ALLERGIES:  No Known Allergies    MEDICATIONS:  STANDING MEDICATIONS  cyanocobalamin 1000 MICROGram(s) Oral daily  heparin   Injectable 5000 Unit(s) SubCutaneous every 8 hours  NIFEdipine XL 60 milliGRAM(s) Oral daily  pantoprazole    Tablet 40 milliGRAM(s) Oral two times a day  simvastatin 10 milliGRAM(s) Oral at bedtime  sodium bicarbonate 1300 milliGRAM(s) Oral three times a day  sodium zirconium cyclosilicate 10 Gram(s) Oral every 12 hours    PRN MEDICATIONS    VITALS:   T(F): 98  HR: 62  BP: 147/85  RR: 18  SpO2: 100%    LABS:                        8.9    7.25  )-----------( 216      ( 17 Sep 2023 07:28 )             25.5     09-17    125<L>  |  95<L>  |  44<H>  ----------------------------<  130<H>  5.2<H>   |  20  |  4.1<HH>    Ca    8.3<L>      17 Sep 2023 16:44  Phos  4.1     09-17  Mg     1.6     09-17    TPro  5.5<L>  /  Alb  3.2<L>  /  TBili  0.2  /  DBili  x   /  AST  12  /  ALT  6   /  AlkPhos  93  09-17    PT/INR - ( 16 Sep 2023 11:05 )   PT: 9.80 sec;   INR: 0.86 ratio         PTT - ( 16 Sep 2023 11:05 )  PTT:33.4 sec  Urinalysis Basic - ( 17 Sep 2023 16:44 )    Color: x / Appearance: x / SG: x / pH: x  Gluc: 130 mg/dL / Ketone: x  / Bili: x / Urobili: x   Blood: x / Protein: x / Nitrite: x   Leuk Esterase: x / RBC: x / WBC x   Sq Epi: x / Non Sq Epi: x / Bacteria: x            Culture - Urine (collected 15 Sep 2023 19:17)  Source: Clean Catch Clean Catch (Midstream)  Final Report (16 Sep 2023 23:50):    No growth          RADIOLOGY:    PHYSICAL EXAM:  GENERAL: NAD  EYES: conjunctiva and sclera clear  ENMT: No tonsillar erythema, exudates, or enlargement; Moist mucous membranes, Good dentition, No lesions  NECK: Supple, No JVD, Normal thyroid  NERVOUS SYSTEM:  Alert & Oriented X3, Good concentration; Motor Strength 5/5 B/L upper and lower extremities; DTRs 2+ intact and symmetric  CHEST/LUNG: Clear to auscultation bilaterally; No rales, rhonchi, wheezing, or rubs  HEART: Regular rate and rhythm; No murmurs, rubs, or gallops  ABDOMEN: Soft, Nontender, Nondistended; Bowel sounds present  EXTREMITIES:  2+ Peripheral Pulses, No clubbing, cyanosis, or edema  LYMPH: No lymphadenopathy noted  SKIN: No rashes or lesions       24H events:    Patient is a 63y old Female who presents with a chief complaint of abnormal op labs (18 Sep 2023 06:12)    Primary diagnosis of Serum potassium elevated    Today is hospital day 3d.    Patient seen bedside this morning, patient resting in bed. Patient has no new complaints and slept well overnight. Explained to patient that we will be monitoring her labs and her blood pressure.    PAST MEDICAL & SURGICAL HISTORY  Hypertension    Hyperlipemia    No significant past surgical history      SOCIAL HISTORY:  Negative for smoking/alcohol/drug use.     ALLERGIES:  No Known Allergies    MEDICATIONS:  STANDING MEDICATIONS  cyanocobalamin 1000 MICROGram(s) Oral daily  heparin   Injectable 5000 Unit(s) SubCutaneous every 8 hours  NIFEdipine XL 60 milliGRAM(s) Oral daily  pantoprazole    Tablet 40 milliGRAM(s) Oral two times a day  simvastatin 10 milliGRAM(s) Oral at bedtime  sodium bicarbonate 1300 milliGRAM(s) Oral three times a day  sodium zirconium cyclosilicate 10 Gram(s) Oral every 12 hours    PRN MEDICATIONS    VITALS:   T(F): 98  HR: 62  BP: 147/85  RR: 18  SpO2: 100%    LABS:                        8.9    7.25  )-----------( 216      ( 17 Sep 2023 07:28 )             25.5     09-17    125<L>  |  95<L>  |  44<H>  ----------------------------<  130<H>  5.2<H>   |  20  |  4.1<HH>    Ca    8.3<L>      17 Sep 2023 16:44  Phos  4.1     09-17  Mg     1.6     09-17    TPro  5.5<L>  /  Alb  3.2<L>  /  TBili  0.2  /  DBili  x   /  AST  12  /  ALT  6   /  AlkPhos  93  09-17    PT/INR - ( 16 Sep 2023 11:05 )   PT: 9.80 sec;   INR: 0.86 ratio         PTT - ( 16 Sep 2023 11:05 )  PTT:33.4 sec  Urinalysis Basic - ( 17 Sep 2023 16:44 )    Color: x / Appearance: x / SG: x / pH: x  Gluc: 130 mg/dL / Ketone: x  / Bili: x / Urobili: x   Blood: x / Protein: x / Nitrite: x   Leuk Esterase: x / RBC: x / WBC x   Sq Epi: x / Non Sq Epi: x / Bacteria: x            Culture - Urine (collected 15 Sep 2023 19:17)  Source: Clean Catch Clean Catch (Midstream)  Final Report (16 Sep 2023 23:50):    No growth          RADIOLOGY:    PHYSICAL EXAM:  GENERAL: NAD  EYES: conjunctiva and sclera clear  ENMT: No tonsillar erythema, exudates, or enlargement; Moist mucous membranes, Good dentition, No lesions  NECK: Supple, No JVD, Normal thyroid  NERVOUS SYSTEM:  Alert & Oriented X3, Good concentration; Motor Strength 5/5 B/L upper and lower extremities; DTRs 2+ intact and symmetric  CHEST/LUNG: Clear to auscultation bilaterally; No rales, rhonchi, wheezing, or rubs  HEART: Regular rate and rhythm; No murmurs, rubs, or gallops  ABDOMEN: Soft, Nontender, Nondistended; Bowel sounds present  EXTREMITIES:  2+ Peripheral Pulses, No clubbing, cyanosis, or edema  LYMPH: No lymphadenopathy noted  SKIN: No rashes or lesions       24H events:    Patient is a 63y old Female who presents with a chief complaint of abnormal op labs (18 Sep 2023 06:12)    Primary diagnosis of Serum potassium elevated    Today is hospital day 3d.    Patient seen bedside this morning, patient resting in bed. Patient has no new complaints and slept well overnight. Explained to patient that we will be monitoring her labs and her blood pressure.    PAST MEDICAL & SURGICAL HISTORY  Hypertension    Hyperlipemia    No significant past surgical history      SOCIAL HISTORY:  Negative for smoking/alcohol/drug use.     ALLERGIES:  No Known Allergies    MEDICATIONS:  STANDING MEDICATIONS  cyanocobalamin 1000 MICROGram(s) Oral daily  heparin   Injectable 5000 Unit(s) SubCutaneous every 8 hours  NIFEdipine XL 60 milliGRAM(s) Oral daily  pantoprazole    Tablet 40 milliGRAM(s) Oral two times a day  simvastatin 10 milliGRAM(s) Oral at bedtime  sodium bicarbonate 1300 milliGRAM(s) Oral three times a day  sodium zirconium cyclosilicate 10 Gram(s) Oral every 12 hours    PRN MEDICATIONS    VITALS:   T(F): 98  HR: 62  BP: 147/85  RR: 18  SpO2: 100%    LABS:                        8.9    7.25  )-----------( 216      ( 17 Sep 2023 07:28 )             25.5     09-17    125<L>  |  95<L>  |  44<H>  ----------------------------<  130<H>  5.2<H>   |  20  |  4.1<HH>    Ca    8.3<L>      17 Sep 2023 16:44  Phos  4.1     09-17  Mg     1.6     09-17    TPro  5.5<L>  /  Alb  3.2<L>  /  TBili  0.2  /  DBili  x   /  AST  12  /  ALT  6   /  AlkPhos  93  09-17    PT/INR - ( 16 Sep 2023 11:05 )   PT: 9.80 sec;   INR: 0.86 ratio         PTT - ( 16 Sep 2023 11:05 )  PTT:33.4 sec  Urinalysis Basic - ( 17 Sep 2023 16:44 )    Color: x / Appearance: x / SG: x / pH: x  Gluc: 130 mg/dL / Ketone: x  / Bili: x / Urobili: x   Blood: x / Protein: x / Nitrite: x   Leuk Esterase: x / RBC: x / WBC x   Sq Epi: x / Non Sq Epi: x / Bacteria: x            Culture - Urine (collected 15 Sep 2023 19:17)  Source: Clean Catch Clean Catch (Midstream)  Final Report (16 Sep 2023 23:50):    No growth          RADIOLOGY:    PHYSICAL EXAM:  GENERAL: NAD  EYES: conjunctiva and sclera clear  ENMT: No tonsillar erythema, exudates, or enlargement; Moist mucous membranes, Good dentition, No lesions  NECK: Supple, No JVD, Normal thyroid  NERVOUS SYSTEM:  Alert & Oriented X3, Good concentration; Motor Strength 5/5 B/L upper and lower extremities; DTRs 2+ intact and symmetric  CHEST/LUNG: Clear to auscultation bilaterally; No rales, rhonchi, wheezing, or rubs  HEART: Regular rate and rhythm; No murmurs, rubs, or gallops  ABDOMEN: Soft, Nontender, Nondistended; Bowel sounds present  EXTREMITIES:  2+ Peripheral Pulses, No clubbing, cyanosis, or edema

## 2023-09-19 VITALS
DIASTOLIC BLOOD PRESSURE: 89 MMHG | OXYGEN SATURATION: 99 % | HEART RATE: 72 BPM | RESPIRATION RATE: 16 BRPM | SYSTOLIC BLOOD PRESSURE: 171 MMHG | TEMPERATURE: 98 F

## 2023-09-19 LAB
ALBUMIN SERPL ELPH-MCNC: 3.9 G/DL — SIGNIFICANT CHANGE UP (ref 3.5–5.2)
ALP SERPL-CCNC: 91 U/L — SIGNIFICANT CHANGE UP (ref 30–115)
ALT FLD-CCNC: 13 U/L — SIGNIFICANT CHANGE UP (ref 0–41)
ANION GAP SERPL CALC-SCNC: 12 MMOL/L — SIGNIFICANT CHANGE UP (ref 7–14)
AST SERPL-CCNC: 29 U/L — SIGNIFICANT CHANGE UP (ref 0–41)
BASOPHILS # BLD AUTO: 0.02 K/UL — SIGNIFICANT CHANGE UP (ref 0–0.2)
BASOPHILS NFR BLD AUTO: 0.2 % — SIGNIFICANT CHANGE UP (ref 0–1)
BILIRUB SERPL-MCNC: 0.3 MG/DL — SIGNIFICANT CHANGE UP (ref 0.2–1.2)
BUN SERPL-MCNC: 38 MG/DL — HIGH (ref 10–20)
CALCIUM SERPL-MCNC: 8.3 MG/DL — LOW (ref 8.4–10.5)
CHLORIDE SERPL-SCNC: 97 MMOL/L — LOW (ref 98–110)
CO2 SERPL-SCNC: 24 MMOL/L — SIGNIFICANT CHANGE UP (ref 17–32)
CREAT SERPL-MCNC: 4.2 MG/DL — CRITICAL HIGH (ref 0.7–1.5)
EGFR: 11 ML/MIN/1.73M2 — LOW
EOSINOPHIL # BLD AUTO: 0.14 K/UL — SIGNIFICANT CHANGE UP (ref 0–0.7)
EOSINOPHIL NFR BLD AUTO: 1.7 % — SIGNIFICANT CHANGE UP (ref 0–8)
GLUCOSE SERPL-MCNC: 92 MG/DL — SIGNIFICANT CHANGE UP (ref 70–99)
HCT VFR BLD CALC: 26.4 % — LOW (ref 37–47)
HGB BLD-MCNC: 9.5 G/DL — LOW (ref 12–16)
IMM GRANULOCYTES NFR BLD AUTO: 0.2 % — SIGNIFICANT CHANGE UP (ref 0.1–0.3)
LYMPHOCYTES # BLD AUTO: 1.98 K/UL — SIGNIFICANT CHANGE UP (ref 1.2–3.4)
LYMPHOCYTES # BLD AUTO: 24.4 % — SIGNIFICANT CHANGE UP (ref 20.5–51.1)
MAGNESIUM SERPL-MCNC: 1.8 MG/DL — SIGNIFICANT CHANGE UP (ref 1.8–2.4)
MCHC RBC-ENTMCNC: 30.5 PG — SIGNIFICANT CHANGE UP (ref 27–31)
MCHC RBC-ENTMCNC: 36 G/DL — SIGNIFICANT CHANGE UP (ref 32–37)
MCV RBC AUTO: 84.9 FL — SIGNIFICANT CHANGE UP (ref 81–99)
MONOCYTES # BLD AUTO: 0.55 K/UL — SIGNIFICANT CHANGE UP (ref 0.1–0.6)
MONOCYTES NFR BLD AUTO: 6.8 % — SIGNIFICANT CHANGE UP (ref 1.7–9.3)
NEUTROPHILS # BLD AUTO: 5.4 K/UL — SIGNIFICANT CHANGE UP (ref 1.4–6.5)
NEUTROPHILS NFR BLD AUTO: 66.7 % — SIGNIFICANT CHANGE UP (ref 42.2–75.2)
NRBC # BLD: 0 /100 WBCS — SIGNIFICANT CHANGE UP (ref 0–0)
PLATELET # BLD AUTO: 235 K/UL — SIGNIFICANT CHANGE UP (ref 130–400)
PMV BLD: 10.6 FL — HIGH (ref 7.4–10.4)
POTASSIUM SERPL-MCNC: 4.7 MMOL/L — SIGNIFICANT CHANGE UP (ref 3.5–5)
POTASSIUM SERPL-SCNC: 4.7 MMOL/L — SIGNIFICANT CHANGE UP (ref 3.5–5)
PROT SERPL-MCNC: 6.2 G/DL — SIGNIFICANT CHANGE UP (ref 6–8)
RBC # BLD: 3.11 M/UL — LOW (ref 4.2–5.4)
RBC # FLD: 11.7 % — SIGNIFICANT CHANGE UP (ref 11.5–14.5)
SODIUM SERPL-SCNC: 133 MMOL/L — LOW (ref 135–146)
WBC # BLD: 8.11 K/UL — SIGNIFICANT CHANGE UP (ref 4.8–10.8)
WBC # FLD AUTO: 8.11 K/UL — SIGNIFICANT CHANGE UP (ref 4.8–10.8)

## 2023-09-19 PROCEDURE — 99239 HOSP IP/OBS DSCHRG MGMT >30: CPT

## 2023-09-19 PROCEDURE — ZZZZZ: CPT

## 2023-09-19 RX ORDER — SODIUM POLYSTYRENE SULFONATE 4.1 MEQ/G
15 POWDER, FOR SUSPENSION ORAL
Qty: 2 | Refills: 0
Start: 2023-09-19 | End: 2023-10-18

## 2023-09-19 RX ORDER — SENNA PLUS 8.6 MG/1
2 TABLET ORAL AT BEDTIME
Refills: 0 | Status: DISCONTINUED | OUTPATIENT
Start: 2023-09-19 | End: 2023-09-19

## 2023-09-19 RX ORDER — PREGABALIN 225 MG/1
1 CAPSULE ORAL
Qty: 30 | Refills: 0
Start: 2023-09-19 | End: 2023-10-18

## 2023-09-19 RX ORDER — POLYETHYLENE GLYCOL 3350 17 G/17G
17 POWDER, FOR SOLUTION ORAL DAILY
Refills: 0 | Status: DISCONTINUED | OUTPATIENT
Start: 2023-09-19 | End: 2023-09-19

## 2023-09-19 RX ADMIN — Medication 1300 MILLIGRAM(S): at 05:25

## 2023-09-19 RX ADMIN — HEPARIN SODIUM 5000 UNIT(S): 5000 INJECTION INTRAVENOUS; SUBCUTANEOUS at 05:25

## 2023-09-19 RX ADMIN — HEPARIN SODIUM 5000 UNIT(S): 5000 INJECTION INTRAVENOUS; SUBCUTANEOUS at 14:13

## 2023-09-19 RX ADMIN — PANTOPRAZOLE SODIUM 40 MILLIGRAM(S): 20 TABLET, DELAYED RELEASE ORAL at 05:25

## 2023-09-19 RX ADMIN — SODIUM ZIRCONIUM CYCLOSILICATE 10 GRAM(S): 10 POWDER, FOR SUSPENSION ORAL at 08:52

## 2023-09-19 RX ADMIN — Medication 60 MILLIGRAM(S): at 05:25

## 2023-09-19 RX ADMIN — Medication 1300 MILLIGRAM(S): at 14:13

## 2023-09-19 RX ADMIN — PREGABALIN 1000 MICROGRAM(S): 225 CAPSULE ORAL at 12:16

## 2023-09-19 RX ADMIN — POLYETHYLENE GLYCOL 3350 17 GRAM(S): 17 POWDER, FOR SOLUTION ORAL at 13:45

## 2023-09-19 NOTE — PROGRESS NOTE ADULT - SUBJECTIVE AND OBJECTIVE BOX
Interventional Radiology Follow- Up Note      63y Female s/p vein mapping  on 9/19  in Interventional Radiology with Dr Darden         Vitals: T(F): 97.5 (09-19-23 @ 04:59), Max: 98.6 (09-18-23 @ 13:29)  HR: 66 (09-19-23 @ 04:59) (66 - 73)  BP: 135/79 (09-19-23 @ 04:59) (135/79 - 195/91)  RR: 18 (09-19-23 @ 04:59) (18 - 18)  SpO2: 100% (09-19-23 @ 04:59) (99% - 100%)  Wt(kg): --    LABS:                        9.5    8.11  )-----------( 235      ( 19 Sep 2023 04:30 )             26.4     09-19    133<L>  |  97<L>  |  38<H>  ----------------------------<  92  4.7   |  24  |  4.2<HH>    Ca    8.3<L>      19 Sep 2023 04:30  Phos  4.3     09-18  Mg     1.8     09-19    TPro  6.2  /  Alb  3.9  /  TBili  0.3  /  DBili  x   /  AST  29  /  ALT  13  /  AlkPhos  91  09-19      Impression: 63y Female admitted with Hyperkalemia        Plan:  - patient evaluated, not a candidate for percutaneous fistula creation  - recommend vascular consult for surgically created fistula   - no further IR intervention at this time     Please call Interventional Radiology [phone] with any questions, concerns, or issues regarding above.

## 2023-09-19 NOTE — DISCHARGE NOTE NURSING/CASE MANAGEMENT/SOCIAL WORK - NSDCPEFALRISK_GEN_ALL_CORE
For information on Fall & Injury Prevention, visit: [email or web address] OR  [email or web address] OR  https://www.cdc.gov/steadi/patient.html

## 2023-09-19 NOTE — PROGRESS NOTE ADULT - ASSESSMENT
stage 5 ckd ? JOSUE baseline cr in the 2 range / hyperkalemia  cr stable   k  noted / continue lokelma / as  repeated k < 5.5 does not need   TDC / check with IR for vein mapping   phos  level noted / no binders   sodium level stable/   continue sodium bicarbonate  sat elevated  / h/h noted start procrit 5000 units s/c weekly  BP better controlled  no acute indication for RRT yet  will follow

## 2023-09-19 NOTE — PROGRESS NOTE ADULT - SUBJECTIVE AND OBJECTIVE BOX
Nephrology progress note    Patient was seen and examined, events over the last 24 h noted .    Allergies:  No Known Allergies    Hospital Medications:   MEDICATIONS  (STANDING):  cyanocobalamin 1000 MICROGram(s) Oral daily  heparin   Injectable 5000 Unit(s) SubCutaneous every 8 hours  NIFEdipine XL 60 milliGRAM(s) Oral daily  pantoprazole    Tablet 40 milliGRAM(s) Oral two times a day  polyethylene glycol 3350 17 Gram(s) Oral daily  senna 2 Tablet(s) Oral at bedtime  simvastatin 10 milliGRAM(s) Oral at bedtime  sodium bicarbonate 1300 milliGRAM(s) Oral three times a day  sodium zirconium cyclosilicate 10 Gram(s) Oral every 12 hours        VITALS:  T(F): 97.9 (09-19-23 @ 13:06), Max: 97.9 (09-19-23 @ 13:06)  HR: 72 (09-19-23 @ 13:06)  BP: 171/89 (09-19-23 @ 13:06)  RR: 16 (09-19-23 @ 13:06)  SpO2: 99% (09-19-23 @ 13:06)  Wt(kg): --    09-17 @ 07:01  -  09-18 @ 07:00  --------------------------------------------------------  IN: 523 mL / OUT: 0 mL / NET: 523 mL          PHYSICAL EXAM:  Constitutional: NAD  HEENT: anicteric sclera, oropharynx clear, MMM  Neck: No JVD  Respiratory: CTAB, no wheezes, rales or rhonchi  Cardiovascular: S1, S2, RRR  Gastrointestinal: BS+, soft, NT/ND  Extremities: No cyanosis or clubbing. No peripheral edema  :  No lane.   Skin: No rashes    LABS:  09-19    133<L>  |  97<L>  |  38<H>  ----------------------------<  92  4.7   |  24  |  4.2<HH>    Ca    8.3<L>      19 Sep 2023 04:30  Phos  4.3     09-18  Mg     1.8     09-19    TPro  6.2  /  Alb  3.9  /  TBili  0.3  /  DBili      /  AST  29  /  ALT  13  /  AlkPhos  91  09-19                          9.5    8.11  )-----------( 235      ( 19 Sep 2023 04:30 )             26.4       Urine Studies:  Urinalysis Basic - ( 19 Sep 2023 04:30 )    Color:  / Appearance:  / SG:  / pH:   Gluc: 92 mg/dL / Ketone:   / Bili:  / Urobili:    Blood:  / Protein:  / Nitrite:    Leuk Esterase:  / RBC:  / WBC    Sq Epi:  / Non Sq Epi:  / Bacteria:         RADIOLOGY & ADDITIONAL STUDIES:

## 2023-09-19 NOTE — DISCHARGE NOTE PROVIDER - HOSPITAL COURSE
Patient is a 63-year-old female with past medical history of hypertension, hyperlipidemia, CKD sent in by Dr. Eric Clayton for elevated potassium, creatinine on routine lab work yesterday. Patient follows with Dr. Eric Clayton for nephrology, has been having monthly blood draws recently for worsening kidney function.  Patient denies fever, chills, change in p.o. intake, endorses normal urinary output.  Denies urinary symptoms.  Denies abdominal pain, diarrhea.  Denies headache.  Patient says she feels at baseline, otherwise well.    In the ED  Vitals: 153/80, HR 65, Temp 97.6, RR 16, Sao2 99% on RA  Labs: Hb 9.6(8.9 4/22), MCV 91, Na 132, K 6.5, Cr 4.6(3.9 8/23), BUN 58, Co2 16  UA - Protein 30mg/dl, trace blood    s/p insulin d50, ca gluconate 2gm, lokelma 10mg and admitted to tele      Patient was seen by nephrology for the abnormal labs and hyperkalemia; nephrology recommended lokelma, and IR consult for TDC. IR consulted and suggested vein mapping as patient's K+ was improving. IR performed vein mapping and patient not a candidate for percutaneous fistula creation, and recommend vascular consult for surgically created fistula.    Upon discharge, pt's labs improved to Na 133, K 4.7, Cl 97, BUN 38, Cr 4.2. Patient discharged on Kayexalate. Patient is a 63-year-old female with past medical history of hypertension, hyperlipidemia, CKD sent in by Dr. Eric Clayton for elevated potassium, creatinine on routine lab work yesterday. Patient follows with Dr. Eric Clayton for nephrology, has been having monthly blood draws recently for worsening kidney function.  Patient denies fever, chills, change in p.o. intake, endorses normal urinary output.  Denies urinary symptoms.  Denies abdominal pain, diarrhea.  Denies headache.  Patient says she feels at baseline, otherwise well.    In the ED  Vitals: 153/80, HR 65, Temp 97.6, RR 16, Sao2 99% on RA  Labs: Hb 9.6(8.9 4/22), MCV 91, Na 132, K 6.5, Cr 4.6(3.9 8/23), BUN 58, Co2 16  UA - Protein 30mg/dl, trace blood    s/p insulin d50, ca gluconate 2gm, lokelma 10mg and admitted to tele      Patient was seen by nephrology for the abnormal labs and hyperkalemia; nephrology recommended Lokelma, and IR consult for TDC. IR consulted and suggested vein mapping as patient's K+ was improving. IR performed vein mapping and patient not a candidate for percutaneous fistula creation, and recommend vascular consult for surgically created fistula. OP f/u w Vascular     Upon discharge, pt's labs improved to Na 133, K 4.7, Cl 97, BUN 38, Cr 4.2. Patient to be discharged on Kayexalate per Nephro.

## 2023-09-19 NOTE — PROGRESS NOTE ADULT - PROVIDER SPECIALTY LIST ADULT
Nephrology
Intervent Radiology
Nephrology
Internal Medicine
Nephrology
Hospitalist
Internal Medicine
Internal Medicine

## 2023-09-19 NOTE — DISCHARGE NOTE PROVIDER - PROVIDER TOKENS
PROVIDER:[TOKEN:[[id number]],FOLLOWUP:[1 week]] PROVIDER:[TOKEN:[[id number]],FOLLOWUP:[1 week]],PROVIDER:[TOKEN:[[id number]]],PROVIDER:[TOKEN:[[id number]]]

## 2023-09-19 NOTE — DISCHARGE NOTE PROVIDER - CARE PROVIDERS DIRECT ADDRESSES
[email or web address]

## 2023-09-19 NOTE — DISCHARGE NOTE PROVIDER - NSDCCPCAREPLAN_GEN_ALL_CORE_FT
PRINCIPAL DISCHARGE DIAGNOSIS  Diagnosis: Serum potassium elevated  Assessment and Plan of Treatment: Hyperkalemia is too much potassium in the blood. Potassium helps keep the right mix of fluids in your body. It also helps your nerves and muscles work as they should. And it keeps your heartbeat in a normal rhythm. Some things can raise potassium levels. These include some health problems, medicines, and kidney problems. (Normally, your kidneys remove extra potassium.)  Too much potassium can cause nausea. It also can cause a heartbeat that isn't normal. But you may not have any symptoms. Too much potassium can be dangerous. That's why it's important to treat it. If you are taking any of the medicines that can raise your levels, your doctor will ask you to stop. You may get medicines to lower your levels. And you may have to limit or not eat foods that have a lot of potassium.  Follow-up care is a key part of your treatment and safety. Be sure to make and go to all appointments, and call your doctor if you are having problems. It's also a good idea to know your test results and keep a list of the medicines you take.      SECONDARY DISCHARGE DIAGNOSES  Diagnosis: Elevated serum creatinine  Assessment and Plan of Treatment:      PRINCIPAL DISCHARGE DIAGNOSIS  Diagnosis: Serum potassium elevated  Assessment and Plan of Treatment: HYPERKALEMIA 2/2 PROGRESSIVE RENAL FAILURE CKD5  - YOU WILL NEED DIALYSIS IN THE NEAR FUTURE  - PLS FOLLOW CLOSELY WITH YOUR KIDNEY DR.   - PLS BE MINDFUL OF THE SPECIFIC DIET: LOW POTTASSIUM AND LOW SALT  - PLS TAKE YOUR BLOOD PRESSUME MEDICATIONS OTHERWISE YOUR KIDNEY WILL CONTINUE TO WORSEN  DIETARY EDUCATED YOU ABOUT RENAL DIET   FOLLOW WITH THE NEPHROLOGIST AND YOUR PMD   YOU SHOULD LOOK INTO KIDNEY TRANSPLANT CENTER ASSESSMENT - PLS DISCUSS WITH YOUR NEPHROLOGIST. BUT YOU MUST SHOW GOOD COMPLIANCE WITH MEDICAL CARE AND RECOMMENDATIONS. TRANSPLANT EVALUATION TAKES MANY MONTHS IN THE INTERIM YOU MAY NEED DIALYSIS. FOLLOW WITH NEPHROLOGIST   Hyperkalemia is too much potassium in the blood. Potassium helps keep the right mix of fluids in your body. It also helps your nerves and muscles work as they should. And it keeps your heartbeat in a normal rhythm. Some things can raise potassium levels. These include some health problems, medicines, and kidney problems. (Normally, your kidneys remove extra potassium.)  Too much potassium can cause nausea. It also can cause a heartbeat that isn't normal. But you may not have any symptoms. Too much potassium can be dangerous. That's why it's important to treat it. If you are taking any of the medicines that can raise your levels, your doctor will ask you to stop. You may get medicines to lower your levels. And you may have to limit or not eat foods that have a lot of potassium.  Follow-up care is a key part of your treatment and safety. Be sure to make and go to all appointments, and call your doctor if you are having problems. It's also a good idea to know your test results and keep a list of the medicines you take.      SECONDARY DISCHARGE DIAGNOSES  Diagnosis: Elevated serum creatinine  Assessment and Plan of Treatment:      PRINCIPAL DISCHARGE DIAGNOSIS  Diagnosis: Serum potassium elevated  Assessment and Plan of Treatment: HYPERKALEMIA 2/2 PROGRESSIVE RENAL FAILURE CKD5  EUVOLEMIC HYPONATREMIA   - YOU WILL NEED DIALYSIS IN THE NEAR FUTURE  - PLS FOLLOW CLOSELY WITH YOUR KIDNEY DR.   - PLS BE MINDFUL OF THE SPECIFIC DIET: LOW POTTASSIUM AND LOW SALT  - PLS TAKE YOUR BLOOD PRESSUME MEDICATIONS OTHERWISE YOUR KIDNEY WILL CONTINUE TO WORSEN  DIETARY EDUCATED YOU ABOUT RENAL DIET   FOLLOW WITH THE NEPHROLOGIST AND YOUR PMD   YOU SHOULD LOOK INTO KIDNEY TRANSPLANT CENTER ASSESSMENT - PLS DISCUSS WITH YOUR NEPHROLOGIST. BUT YOU MUST SHOW GOOD COMPLIANCE WITH MEDICAL CARE AND RECOMMENDATIONS. TRANSPLANT EVALUATION TAKES MANY MONTHS IN THE INTERIM YOU MAY NEED DIALYSIS. FOLLOW WITH NEPHROLOGIST   Hyperkalemia is too much potassium in the blood. Potassium helps keep the right mix of fluids in your body. It also helps your nerves and muscles work as they should. And it keeps your heartbeat in a normal rhythm. Some things can raise potassium levels. These include some health problems, medicines, and kidney problems. (Normally, your kidneys remove extra potassium.)  Too much potassium can cause nausea. It also can cause a heartbeat that isn't normal. But you may not have any symptoms. Too much potassium can be dangerous. That's why it's important to treat it. If you are taking any of the medicines that can raise your levels, your doctor will ask you to stop. You may get medicines to lower your levels. And you may have to limit or not eat foods that have a lot of potassium.  Follow-up care is a key part of your treatment and safety. Be sure to make and go to all appointments, and call your doctor if you are having problems. It's also a good idea to know your test results and keep a list of the medicines you take.      SECONDARY DISCHARGE DIAGNOSES  Diagnosis: Elevated serum creatinine  Assessment and Plan of Treatment:

## 2023-09-19 NOTE — DISCHARGE NOTE PROVIDER - CARE PROVIDER_API CALL
Santos Dale  Nephrology  [address] [place] [id number]  Phone: [phone]  Fax: [phone]  Follow Up Time: 1 week   Santos Dale  Nephrology  [address] [place] [address]  Phone: [phone]  Fax: [phone]  Follow Up Time: 1 week    Jose Lai  Internal Medicine  [address] [place] [address]  Phone: [phone]  Fax: [phone]  Follow Up Time:     Chetna Parker  Vascular Surgery  [address] [facility], [address]  [place] [address]  Phone: [phone]  Fax: [phone]  Follow Up Time:

## 2023-09-19 NOTE — PROGRESS NOTE ADULT - REASON FOR ADMISSION
abnormal op labs
Hyperkalemia
abnormal op labs

## 2023-09-19 NOTE — DISCHARGE NOTE NURSING/CASE MANAGEMENT/SOCIAL WORK - PATIENT PORTAL LINK FT
You can access the FollowMyHealth Patient Portal offered by [facility] by registering at the following website: [email or web address]. By joining [facility]’s FollowMyHealth portal, you will also be able to view your health information using other applications (apps) compatible with our system.

## 2023-09-19 NOTE — DISCHARGE NOTE PROVIDER - NSDCMRMEDTOKEN_GEN_ALL_CORE_FT
cyanocobalamin 1000 mcg oral tablet: 1 tab(s) orally once a day  NIFEdipine 60 mg oral tablet, extended release: 1 orally once a day  simvastatin 10 mg oral tablet: 1 tab(s) orally once a day (at bedtime)  sodium bicarbonate 650 mg oral tablet: 1 tab(s) orally every 8 hours  sodium polystyrene sulfonate oral and rectal powder: 15 gram(s) orally 2 times a day

## 2023-09-19 NOTE — DISCHARGE NOTE PROVIDER - ATTENDING DISCHARGE PHYSICAL EXAMINATION:
Vital Signs Last 24 Hrs  T(C): 36.6 (19 Sep 2023 13:06), Max: 36.6 (19 Sep 2023 13:06)  T(F): 97.9 (19 Sep 2023 13:06), Max: 97.9 (19 Sep 2023 13:06)  HR: 72 (19 Sep 2023 13:06) (66 - 72)  BP: 171/89 (19 Sep 2023 13:06) (135/79 - 195/91)  BP(mean): --  RR: 16 (19 Sep 2023 13:06) (16 - 18)  SpO2: 99% (19 Sep 2023 13:06) (99% - 100%)    Parameters below as of 19 Sep 2023 04:59  Patient On (Oxygen Delivery Method): room air    GEN: NAD  RESP; CTA B/L   CV: NL S1/2   GI:+BS SOFT NT ND  EXT: NO E/C/C                         9.5    8.11  )-----------( 235      ( 19 Sep 2023 04:30 )             26.4   09-19    133<L>  |  97<L>  |  38<H>  ----------------------------<  92  4.7   |  24  |  4.2<HH>    Ca    8.3<L>      19 Sep 2023 04:30  Phos  4.3     09-18  Mg     1.8     09-19    TPro  6.2  /  Alb  3.9  /  TBili  0.3  /  DBili  x   /  AST  29  /  ALT  13  /  AlkPhos  91  09-19

## 2023-09-20 ENCOUNTER — INPATIENT (INPATIENT)
Facility: HOSPITAL | Age: 63
LOS: 4 days | Discharge: HOME | DRG: 425 | End: 2023-09-25
Attending: INTERNAL MEDICINE | Admitting: INTERNAL MEDICINE
Payer: MEDICAID

## 2023-09-20 VITALS
RESPIRATION RATE: 18 BRPM | HEART RATE: 75 BPM | DIASTOLIC BLOOD PRESSURE: 78 MMHG | SYSTOLIC BLOOD PRESSURE: 156 MMHG | WEIGHT: 115.08 LBS | OXYGEN SATURATION: 99 % | TEMPERATURE: 99 F | HEIGHT: 56 IN

## 2023-09-20 DIAGNOSIS — E87.1 HYPO-OSMOLALITY AND HYPONATREMIA: ICD-10-CM

## 2023-09-20 PROBLEM — I10 ESSENTIAL (PRIMARY) HYPERTENSION: Chronic | Status: ACTIVE | Noted: 2023-09-15

## 2023-09-20 PROBLEM — E78.5 HYPERLIPIDEMIA, UNSPECIFIED: Chronic | Status: ACTIVE | Noted: 2023-09-15

## 2023-09-20 LAB
24R-OH-CALCIDIOL SERPL-MCNC: 12 NG/ML — LOW (ref 30–80)
ALBUMIN SERPL ELPH-MCNC: 4.1 G/DL — SIGNIFICANT CHANGE UP (ref 3.5–5.2)
ALBUMIN SERPL ELPH-MCNC: 4.3 G/DL — SIGNIFICANT CHANGE UP (ref 3.5–5.2)
ALP SERPL-CCNC: 101 U/L — SIGNIFICANT CHANGE UP (ref 30–115)
ALP SERPL-CCNC: 105 U/L — SIGNIFICANT CHANGE UP (ref 30–115)
ALT FLD-CCNC: 41 U/L — SIGNIFICANT CHANGE UP (ref 0–41)
ALT FLD-CCNC: 42 U/L — HIGH (ref 0–41)
ANION GAP SERPL CALC-SCNC: 10 MMOL/L — SIGNIFICANT CHANGE UP (ref 7–14)
ANION GAP SERPL CALC-SCNC: 11 MMOL/L — SIGNIFICANT CHANGE UP (ref 7–14)
ANION GAP SERPL CALC-SCNC: 12 MMOL/L — SIGNIFICANT CHANGE UP (ref 7–14)
AST SERPL-CCNC: 69 U/L — HIGH (ref 0–41)
AST SERPL-CCNC: 69 U/L — HIGH (ref 0–41)
BASOPHILS # BLD AUTO: 0.03 K/UL — SIGNIFICANT CHANGE UP (ref 0–0.2)
BASOPHILS NFR BLD AUTO: 0.3 % — SIGNIFICANT CHANGE UP (ref 0–1)
BILIRUB SERPL-MCNC: 0.4 MG/DL — SIGNIFICANT CHANGE UP (ref 0.2–1.2)
BILIRUB SERPL-MCNC: 0.4 MG/DL — SIGNIFICANT CHANGE UP (ref 0.2–1.2)
BUN SERPL-MCNC: 31 MG/DL — HIGH (ref 10–20)
BUN SERPL-MCNC: 32 MG/DL — HIGH (ref 10–20)
BUN SERPL-MCNC: 33 MG/DL — HIGH (ref 10–20)
CALCIUM SERPL-MCNC: 8 MG/DL — LOW (ref 8.4–10.5)
CALCIUM SERPL-MCNC: 8 MG/DL — LOW (ref 8.4–10.5)
CALCIUM SERPL-MCNC: 8.2 MG/DL — LOW (ref 8.4–10.5)
CHLORIDE SERPL-SCNC: 86 MMOL/L — LOW (ref 98–110)
CHLORIDE SERPL-SCNC: 91 MMOL/L — LOW (ref 98–110)
CHLORIDE SERPL-SCNC: 93 MMOL/L — LOW (ref 98–110)
CHLORIDE UR-SCNC: 23 — SIGNIFICANT CHANGE UP
CHOLEST SERPL-MCNC: 154 MG/DL — SIGNIFICANT CHANGE UP
CO2 SERPL-SCNC: 22 MMOL/L — SIGNIFICANT CHANGE UP (ref 17–32)
CO2 SERPL-SCNC: 23 MMOL/L — SIGNIFICANT CHANGE UP (ref 17–32)
CO2 SERPL-SCNC: 24 MMOL/L — SIGNIFICANT CHANGE UP (ref 17–32)
CREAT SERPL-MCNC: 3.9 MG/DL — HIGH (ref 0.7–1.5)
CREAT SERPL-MCNC: 4.1 MG/DL — CRITICAL HIGH (ref 0.7–1.5)
CREAT SERPL-MCNC: 4.2 MG/DL — CRITICAL HIGH (ref 0.7–1.5)
EGFR: 11 ML/MIN/1.73M2 — LOW
EGFR: 12 ML/MIN/1.73M2 — LOW
EGFR: 12 ML/MIN/1.73M2 — LOW
EOSINOPHIL # BLD AUTO: 0.09 K/UL — SIGNIFICANT CHANGE UP (ref 0–0.7)
EOSINOPHIL NFR BLD AUTO: 0.9 % — SIGNIFICANT CHANGE UP (ref 0–8)
GLUCOSE SERPL-MCNC: 114 MG/DL — HIGH (ref 70–99)
GLUCOSE SERPL-MCNC: 119 MG/DL — HIGH (ref 70–99)
GLUCOSE SERPL-MCNC: 143 MG/DL — HIGH (ref 70–99)
H PYLORI AG STL QL: POSITIVE
HCT VFR BLD CALC: 26.4 % — LOW (ref 37–47)
HDLC SERPL-MCNC: 80 MG/DL — SIGNIFICANT CHANGE UP
HGB BLD-MCNC: 9.5 G/DL — LOW (ref 12–16)
IMM GRANULOCYTES NFR BLD AUTO: 0.4 % — HIGH (ref 0.1–0.3)
LIPID PNL WITH DIRECT LDL SERPL: 60 MG/DL — SIGNIFICANT CHANGE UP
LYMPHOCYTES # BLD AUTO: 1.46 K/UL — SIGNIFICANT CHANGE UP (ref 1.2–3.4)
LYMPHOCYTES # BLD AUTO: 14.2 % — LOW (ref 20.5–51.1)
MAGNESIUM SERPL-MCNC: 1.6 MG/DL — LOW (ref 1.8–2.4)
MAGNESIUM SERPL-MCNC: 1.6 MG/DL — LOW (ref 1.8–2.4)
MAGNESIUM SERPL-MCNC: 2.8 MG/DL — HIGH (ref 1.8–2.4)
MCHC RBC-ENTMCNC: 29.7 PG — SIGNIFICANT CHANGE UP (ref 27–31)
MCHC RBC-ENTMCNC: 36 G/DL — SIGNIFICANT CHANGE UP (ref 32–37)
MCV RBC AUTO: 82.5 FL — SIGNIFICANT CHANGE UP (ref 81–99)
MONOCYTES # BLD AUTO: 0.56 K/UL — SIGNIFICANT CHANGE UP (ref 0.1–0.6)
MONOCYTES NFR BLD AUTO: 5.5 % — SIGNIFICANT CHANGE UP (ref 1.7–9.3)
NEUTROPHILS # BLD AUTO: 8.08 K/UL — HIGH (ref 1.4–6.5)
NEUTROPHILS NFR BLD AUTO: 78.7 % — HIGH (ref 42.2–75.2)
NON HDL CHOLESTEROL: 74 MG/DL — SIGNIFICANT CHANGE UP
NRBC # BLD: 0 /100 WBCS — SIGNIFICANT CHANGE UP (ref 0–0)
OSMOLALITY SERPL: 273 MOS/KG — LOW (ref 280–301)
OSMOLALITY UR: 112 MOS/KG — SIGNIFICANT CHANGE UP (ref 50–1200)
PHOSPHATE SERPL-MCNC: 4.8 MG/DL — SIGNIFICANT CHANGE UP (ref 2.1–4.9)
PHOSPHATE SERPL-MCNC: 5.4 MG/DL — HIGH (ref 2.1–4.9)
PLATELET # BLD AUTO: 209 K/UL — SIGNIFICANT CHANGE UP (ref 130–400)
PMV BLD: 10.3 FL — SIGNIFICANT CHANGE UP (ref 7.4–10.4)
POTASSIUM SERPL-MCNC: 4.4 MMOL/L — SIGNIFICANT CHANGE UP (ref 3.5–5)
POTASSIUM SERPL-MCNC: 4.4 MMOL/L — SIGNIFICANT CHANGE UP (ref 3.5–5)
POTASSIUM SERPL-MCNC: 4.6 MMOL/L — SIGNIFICANT CHANGE UP (ref 3.5–5)
POTASSIUM SERPL-SCNC: 4.4 MMOL/L — SIGNIFICANT CHANGE UP (ref 3.5–5)
POTASSIUM SERPL-SCNC: 4.4 MMOL/L — SIGNIFICANT CHANGE UP (ref 3.5–5)
POTASSIUM SERPL-SCNC: 4.6 MMOL/L — SIGNIFICANT CHANGE UP (ref 3.5–5)
POTASSIUM UR-SCNC: 5 MMOL/L — SIGNIFICANT CHANGE UP
PROT SERPL-MCNC: 6.2 G/DL — SIGNIFICANT CHANGE UP (ref 6–8)
PROT SERPL-MCNC: 6.5 G/DL — SIGNIFICANT CHANGE UP (ref 6–8)
RBC # BLD: 3.2 M/UL — LOW (ref 4.2–5.4)
RBC # FLD: 11.4 % — LOW (ref 11.5–14.5)
SODIUM SERPL-SCNC: 121 MMOL/L — LOW (ref 135–146)
SODIUM SERPL-SCNC: 124 MMOL/L — LOW (ref 135–146)
SODIUM SERPL-SCNC: 127 MMOL/L — LOW (ref 135–146)
SODIUM UR-SCNC: 37 MMOL/L — SIGNIFICANT CHANGE UP
TRIGL SERPL-MCNC: 70 MG/DL — SIGNIFICANT CHANGE UP
TROPONIN T SERPL-MCNC: <0.01 NG/ML — SIGNIFICANT CHANGE UP
TROPONIN T SERPL-MCNC: <0.01 NG/ML — SIGNIFICANT CHANGE UP
WBC # BLD: 10.26 K/UL — SIGNIFICANT CHANGE UP (ref 4.8–10.8)
WBC # FLD AUTO: 10.26 K/UL — SIGNIFICANT CHANGE UP (ref 4.8–10.8)

## 2023-09-20 PROCEDURE — 83735 ASSAY OF MAGNESIUM: CPT

## 2023-09-20 PROCEDURE — 93010 ELECTROCARDIOGRAM REPORT: CPT | Mod: 77

## 2023-09-20 PROCEDURE — 84443 ASSAY THYROID STIM HORMONE: CPT

## 2023-09-20 PROCEDURE — 71046 X-RAY EXAM CHEST 2 VIEWS: CPT | Mod: 26

## 2023-09-20 PROCEDURE — 84300 ASSAY OF URINE SODIUM: CPT

## 2023-09-20 PROCEDURE — 80061 LIPID PANEL: CPT

## 2023-09-20 PROCEDURE — 85025 COMPLETE CBC W/AUTO DIFF WBC: CPT

## 2023-09-20 PROCEDURE — 82310 ASSAY OF CALCIUM: CPT

## 2023-09-20 PROCEDURE — 84480 ASSAY TRIIODOTHYRONINE (T3): CPT

## 2023-09-20 PROCEDURE — 93005 ELECTROCARDIOGRAM TRACING: CPT

## 2023-09-20 PROCEDURE — 80074 ACUTE HEPATITIS PANEL: CPT

## 2023-09-20 PROCEDURE — 84100 ASSAY OF PHOSPHORUS: CPT

## 2023-09-20 PROCEDURE — 83930 ASSAY OF BLOOD OSMOLALITY: CPT

## 2023-09-20 PROCEDURE — 80048 BASIC METABOLIC PNL TOTAL CA: CPT

## 2023-09-20 PROCEDURE — 84484 ASSAY OF TROPONIN QUANT: CPT

## 2023-09-20 PROCEDURE — 84540 ASSAY OF URINE/UREA-N: CPT

## 2023-09-20 PROCEDURE — 85610 PROTHROMBIN TIME: CPT

## 2023-09-20 PROCEDURE — 81001 URINALYSIS AUTO W/SCOPE: CPT

## 2023-09-20 PROCEDURE — 82436 ASSAY OF URINE CHLORIDE: CPT

## 2023-09-20 PROCEDURE — 84550 ASSAY OF BLOOD/URIC ACID: CPT

## 2023-09-20 PROCEDURE — 76705 ECHO EXAM OF ABDOMEN: CPT

## 2023-09-20 PROCEDURE — 84436 ASSAY OF TOTAL THYROXINE: CPT

## 2023-09-20 PROCEDURE — 93306 TTE W/DOPPLER COMPLETE: CPT

## 2023-09-20 PROCEDURE — 70450 CT HEAD/BRAIN W/O DYE: CPT | Mod: 26,MA

## 2023-09-20 PROCEDURE — 99285 EMERGENCY DEPT VISIT HI MDM: CPT

## 2023-09-20 PROCEDURE — 82306 VITAMIN D 25 HYDROXY: CPT

## 2023-09-20 PROCEDURE — 83970 ASSAY OF PARATHORMONE: CPT

## 2023-09-20 PROCEDURE — 84156 ASSAY OF PROTEIN URINE: CPT

## 2023-09-20 PROCEDURE — 83935 ASSAY OF URINE OSMOLALITY: CPT

## 2023-09-20 PROCEDURE — 84133 ASSAY OF URINE POTASSIUM: CPT

## 2023-09-20 PROCEDURE — 80053 COMPREHEN METABOLIC PANEL: CPT

## 2023-09-20 PROCEDURE — 87086 URINE CULTURE/COLONY COUNT: CPT

## 2023-09-20 PROCEDURE — 36415 COLL VENOUS BLD VENIPUNCTURE: CPT

## 2023-09-20 PROCEDURE — 99223 1ST HOSP IP/OBS HIGH 75: CPT

## 2023-09-20 PROCEDURE — 82533 TOTAL CORTISOL: CPT

## 2023-09-20 RX ORDER — SODIUM CHLORIDE 9 MG/ML
1000 INJECTION INTRAMUSCULAR; INTRAVENOUS; SUBCUTANEOUS
Refills: 0 | Status: DISCONTINUED | OUTPATIENT
Start: 2023-09-20 | End: 2023-09-20

## 2023-09-20 RX ORDER — SODIUM CHLORIDE 9 MG/ML
1000 INJECTION, SOLUTION INTRAVENOUS ONCE
Refills: 0 | Status: COMPLETED | OUTPATIENT
Start: 2023-09-20 | End: 2023-09-20

## 2023-09-20 RX ORDER — ACETAMINOPHEN 500 MG
650 TABLET ORAL EVERY 6 HOURS
Refills: 0 | Status: DISCONTINUED | OUTPATIENT
Start: 2023-09-20 | End: 2023-09-22

## 2023-09-20 RX ORDER — SODIUM POLYSTYRENE SULFONATE 4.1 MEQ/G
15 POWDER, FOR SUSPENSION ORAL
Refills: 0 | Status: DISCONTINUED | OUTPATIENT
Start: 2023-09-20 | End: 2023-09-20

## 2023-09-20 RX ORDER — SODIUM BICARBONATE 1 MEQ/ML
650 SYRINGE (ML) INTRAVENOUS EVERY 8 HOURS
Refills: 0 | Status: DISCONTINUED | OUTPATIENT
Start: 2023-09-20 | End: 2023-09-25

## 2023-09-20 RX ORDER — PREGABALIN 225 MG/1
1000 CAPSULE ORAL DAILY
Refills: 0 | Status: DISCONTINUED | OUTPATIENT
Start: 2023-09-20 | End: 2023-09-25

## 2023-09-20 RX ORDER — INFLUENZA VIRUS VACCINE 15; 15; 15; 15 UG/.5ML; UG/.5ML; UG/.5ML; UG/.5ML
0.5 SUSPENSION INTRAMUSCULAR ONCE
Refills: 0 | Status: DISCONTINUED | OUTPATIENT
Start: 2023-09-20 | End: 2023-09-25

## 2023-09-20 RX ORDER — SENNA PLUS 8.6 MG/1
2 TABLET ORAL AT BEDTIME
Refills: 0 | Status: DISCONTINUED | OUTPATIENT
Start: 2023-09-20 | End: 2023-09-25

## 2023-09-20 RX ORDER — POLYETHYLENE GLYCOL 3350 17 G/17G
17 POWDER, FOR SOLUTION ORAL DAILY
Refills: 0 | Status: DISCONTINUED | OUTPATIENT
Start: 2023-09-20 | End: 2023-09-24

## 2023-09-20 RX ORDER — METOCLOPRAMIDE HCL 10 MG
10 TABLET ORAL ONCE
Refills: 0 | Status: COMPLETED | OUTPATIENT
Start: 2023-09-20 | End: 2023-09-20

## 2023-09-20 RX ORDER — HEPARIN SODIUM 5000 [USP'U]/ML
5000 INJECTION INTRAVENOUS; SUBCUTANEOUS EVERY 8 HOURS
Refills: 0 | Status: DISCONTINUED | OUTPATIENT
Start: 2023-09-20 | End: 2023-09-25

## 2023-09-20 RX ORDER — CHLORHEXIDINE GLUCONATE 213 G/1000ML
1 SOLUTION TOPICAL
Refills: 0 | Status: DISCONTINUED | OUTPATIENT
Start: 2023-09-20 | End: 2023-09-25

## 2023-09-20 RX ORDER — NIFEDIPINE 30 MG
60 TABLET, EXTENDED RELEASE 24 HR ORAL DAILY
Refills: 0 | Status: DISCONTINUED | OUTPATIENT
Start: 2023-09-20 | End: 2023-09-25

## 2023-09-20 RX ORDER — MAGNESIUM SULFATE 500 MG/ML
2 VIAL (ML) INJECTION ONCE
Refills: 0 | Status: COMPLETED | OUTPATIENT
Start: 2023-09-20 | End: 2023-09-20

## 2023-09-20 RX ORDER — SIMVASTATIN 20 MG/1
10 TABLET, FILM COATED ORAL AT BEDTIME
Refills: 0 | Status: DISCONTINUED | OUTPATIENT
Start: 2023-09-20 | End: 2023-09-21

## 2023-09-20 RX ORDER — SENNA PLUS 8.6 MG/1
2 TABLET ORAL AT BEDTIME
Refills: 0 | Status: DISCONTINUED | OUTPATIENT
Start: 2023-09-20 | End: 2023-09-20

## 2023-09-20 RX ADMIN — SODIUM CHLORIDE 75 MILLILITER(S): 9 INJECTION INTRAMUSCULAR; INTRAVENOUS; SUBCUTANEOUS at 11:38

## 2023-09-20 RX ADMIN — Medication 10 MILLIGRAM(S): at 08:42

## 2023-09-20 RX ADMIN — Medication 25 GRAM(S): at 14:31

## 2023-09-20 RX ADMIN — Medication 650 MILLIGRAM(S): at 21:33

## 2023-09-20 RX ADMIN — HEPARIN SODIUM 5000 UNIT(S): 5000 INJECTION INTRAVENOUS; SUBCUTANEOUS at 21:33

## 2023-09-20 RX ADMIN — HEPARIN SODIUM 5000 UNIT(S): 5000 INJECTION INTRAVENOUS; SUBCUTANEOUS at 14:31

## 2023-09-20 RX ADMIN — SODIUM CHLORIDE 1000 MILLILITER(S): 9 INJECTION, SOLUTION INTRAVENOUS at 08:43

## 2023-09-20 RX ADMIN — Medication 650 MILLIGRAM(S): at 14:31

## 2023-09-20 RX ADMIN — SIMVASTATIN 10 MILLIGRAM(S): 20 TABLET, FILM COATED ORAL at 21:39

## 2023-09-20 RX ADMIN — POLYETHYLENE GLYCOL 3350 17 GRAM(S): 17 POWDER, FOR SOLUTION ORAL at 22:30

## 2023-09-20 NOTE — ED ADULT NURSE NOTE - NSFALLUNIVINTERV_ED_ALL_ED
Monitor gait and stability/Use of alarms - bed, stretcher, chair and/or video monitoring/Bed/Stretcher in lowest position, wheels locked, appropriate side rails in place/Call bell, personal items and telephone in reach/Instruct patient to call for assistance before getting out of bed/chair/stretcher/Non-slip footwear applied when patient is off stretcher/[place] to call system/Physically safe environment - no spills, clutter or unnecessary equipment/Purposeful proactive rounding/Room/bathroom lighting operational, light cord in reach

## 2023-09-20 NOTE — ED PROVIDER NOTE - OBJECTIVE STATEMENT
63-year-old female with PMH HTN, HLD, CKD, recent admission with discharge yesterday for elevated creatinine/potassium--treated and had vein mapping for AV fistula, follows with Dr. Segal presents with moderate constant throbbing non-radiating global HA, heart palpitations x yesterday.   +nausea, no vomiting.   no fever, cp, sob, rash, abd pain, back pain.

## 2023-09-20 NOTE — PROGRESS NOTE ADULT - ASSESSMENT
Incomplete Nephrology Progress Note       63-year-old female with past medical history of hypertension, hyperlipidemia, CKD5 who was discharged from [facility] yesterday and was at the hospital for JOSUE over CKD stage 5 got readmitted for headache and palpitations.  Patient with no clinical symptoms of uremia or ESKD.     Assessment and plan    CKD stage 5/ Headache/Palpitations   - Potassium under control- 4.4. On Lokelma 10 mg Q 24   - Bicarbonate levels- 24 on sodium bicarbonate 650 mg TID   - Blood pressure under control- on nifedipine ER 60 mg once daily  - Anemia of ESRD - Hbg 9.5. Not on REMA  - MBD- Get phosphorous, Vitamin D  and PTH levels  - Was supposed to follow up after recent discharge for vein mapping   - Pending working up for palpitations   - Nephrology will follow if patient is admitted    Incomplete Nephrology Progress Note       63-year-old female with past medical history of hypertension, hyperlipidemia, CKD5 who was discharged from [facility] yesterday and was at the hospital for JOSUE over CKD stage 5 got readmitted for headache and palpitations.  Patient with no clinical symptoms of uremia or ESKD.     Assessment and plan    CKD stage 5/ Headache/Palpitations   - Potassium under control- 4.4. On Lokelma 10 mg Q 24   - Bicarbonate levels- 24 on sodium bicarbonate 650 mg TID   - Blood pressure under control- on nifedipine ER 60 mg once daily  - Anemia of ESRD - Hbg 9.5. Not on REMA  - MBD- Get phosphorous, Vitamin D  and PTH levels  - Was supposed to follow up after recent discharge for vein mapping   - CT head negative   - Pending working up for palpitations   - Nephrology will follow if patient is admitted    Incomplete Nephrology Progress Note       63-year-old female with past medical history of hypertension, hyperlipidemia, CKD5 who was discharged from [facility] yesterday and was at the hospital for JOSUE over CKD stage 5 got readmitted for headache and palpitations.  Patient with no clinical symptoms of uremia or ESKD.     Assessment and plan    CKD stage 5/ Headache/Palpitations   - Potassium under control- 4.4. On Lokelma 10 mg Q 24   - Bicarbonate levels- 24 on sodium bicarbonate 650 mg TID   - Blood pressure under control- on nifedipine ER 60 mg once daily  - Anemia of ESRD - Hbg 9.5. Not on REMA  - Hyponatremia - 121- in setting of volume overload- Get urine studies   - MBD- Get phosphorous, Vitamin D  and PTH levels  - Was supposed to follow up after recent discharge for vein mapping   - CT head negative   - Pending working up for palpitations   - Nephrology will follow if patient is admitted    63-year-old female with past medical history of hypertension, hyperlipidemia, CKD5 who was discharged from [facility] yesterday and was at the hospital for JOSUE over CKD stage 5 got readmitted for headache and palpitations.  Patient with no clinical symptoms of uremia or ESKD.     Assessment and plan    CKD stage 5/ Headache/Palpitations/ Moderate hyponatremia polydipsia   - Potassium under control- 4.4. On Lokelma 10 mg Q 24   - Bicarbonate levels- 24 on sodium bicarbonate 650 mg TID   - Blood pressure under control- on nifedipine ER 60 mg once daily  - Anemia of ESRD - Hbg 9.5. Not on REMA  - Hyponatremia - 121- in setting of volume overload- Get urine studies. Fluid restriction 1 liter/ day. Stop IV fluid hydration. Seems to be from polydipsia.   - Repeat labs now and trend sodium Q 6 hrly  - MBD- Get phosphorous, Vitamin D  and PTH levels  - Was supposed to follow up after recent discharge for vein mapping   - CT head negative   - Pending working up for palpitations   - Communicated plan to the primary team   - Nephrology will follow   63-year-old female with past medical history of hypertension, hyperlipidemia, CKD5 who was discharged from [facility] yesterday and was at the hospital for JOSUE over CKD stage 5 got readmitted for headache and palpitations.  Patient with no clinical symptoms of uremia or ESKD.     Assessment and plan    CKD stage 5/ Headache/Palpitations/ Moderate hyponatremia polydipsia   - Potassium under control- 4.4. On Lokelma 10 mg Q 24   - Bicarbonate levels- 24 on sodium bicarbonate 650 mg TID   - Blood pressure under control- on nifedipine ER 60 mg once daily  - Anemia of ESRD - Hbg 9.5. Not on REMA  - Hyponatremia - 121- in setting of volume overload- Get urine studies. Fluid restriction 1 liter/ day. Stop IV fluid hydration. Seems to be from polydipsia.   - Get uric acid, TSH and cortisol AM   - Repeat labs now and trend sodium Q 6 hrly  - MBD- Get phosphorous, Vitamin D  and PTH levels  - Was supposed to follow up after recent discharge for vein mapping   - CT head negative   - Pending working up for palpitations   - Communicated plan to the primary team   - Nephrology will follow   63-year-old female with past medical history of hypertension, hyperlipidemia, CKD5 who was discharged from [facility] yesterday and was at the hospital for JOSUE over CKD stage 5 got readmitted for headache and palpitations.  Patient with no clinical symptoms of uremia or ESKD.     Assessment and plan    CKD stage 5/ Headache/Palpitations/ Moderate hyponatremia polydipsia   - Potassium under control- 4.4. On Lokelma 10 mg Q 24   - Bicarbonate levels- 24 on sodium bicarbonate 650 mg TID   - Blood pressure under control- on nifedipine ER 60 mg once daily  - Anemia of ESRD - Hbg 9.5. Not on REMA  - Hyponatremia - 121- in setting of volume overload- Get urine studies. Fluid restriction 1 liter/ day. Stop IV fluid hydration. Seems to be from polydipsia.   - Urine osmolarity hints towards urinary dilution  - Get uric acid, TSH and cortisol AM   - Repeat labs now and trend sodium Q 6 hrly  - MBD- Get phosphorous, Vitamin D  and PTH levels  - Was supposed to follow up after recent discharge for vein mapping   - CT head negative   - Pending working up for palpitations   - Communicated plan to the primary team   - Nephrology will follow

## 2023-09-20 NOTE — H&P ADULT - ASSESSMENT
62 yo F w h/o HTN, HLD, CKD5 presents for evaluation of headache and palpitations. Labs significant for Na 121.     #Hyponatremia likely 2/2 polydipsia - pt endorses incr po intake since discharge yest  #CKD 5 - recently admitted for vein mapping for AVF  - presenting symptoms 2/2 hyponatremia - labs and imaging otherwise unrevealing  - Nephro recs appreciated and urine studies reviews  - no more IVF - cont fluid restriction 1L/day - trend bmp q6 - avoid overcorrection of >8-10 in 24 hours    #HTN  - cont nifedipine    #HLD  - cont statin    #Anemia of CD  - Hgb 9.5 - monitor cbc and for signs signs of bleed    DVT ppx: heparin subq  GI ppx: ni  Activity: IAT  Diet: DASH    62 yo F w h/o HTN, HLD, CKD5 presents for evaluation of headache and palpitations. Labs significant for Na 121.     #Hyponatremia likely 2/2 polydipsia - pt endorses incr po intake since discharge yest  #CKD 5 - recently admitted for vein mapping for AVF  - presenting symptoms 2/2 hyponatremia - labs and imaging otherwise unrevealing  - Nephro recs appreciated and urine studies reviews  - no more IVF - cont fluid restriction 1L/day - trend bmp q6 - avoid overcorrection of >8-10 in 24 hours    #Elevated LFTs - rising from prior labwork  - monitor for now - if continuing to rise get RUQ US    #HTN  - cont nifedipine    #HLD  - cont statin    #Anemia of CD  - Hgb 9.5 - monitor cbc and for signs signs of bleed    DVT ppx: heparin subq  GI ppx: ni  Activity: IAT  Diet: DASH

## 2023-09-20 NOTE — H&P ADULT - ATTENDING COMMENTS
ROS and physical exam as above.    A/P:    64 yo F w h/o HTN, HLD, CKD5 presents for evaluation of headache and palpitations. Labs significant for Na 121.     # Hyposmolar Hyponatremia   # CKD 5   - patient is hemodynamically stable  - Na on admission 121, was 133 on discharge yesterday   - Nephro recs appreciated and urine studies reviews  - d/c IVF   - cont fluid restriction 1L/day   - trend bmp q6 - avoid overcorrection of >8-10 in 24 hours  - Get uric acid, TSH and cortisol AM , phosphorous, Vitamin D  and PTH levels    # Transaminitis  - monitor for now   - if continuing to rise get RUQ US and hepatitis panel    # HTN - cont nifedipine    #HLD - cont statin    #Anemia of CD - Hgb 9.5 - monitor cbc and for signs signs of bleed    Rest as above  discussed with patient,  and daughter at bedside    Jose Nj  Internal Medicine Attending

## 2023-09-20 NOTE — PROGRESS NOTE ADULT - SUBJECTIVE AND OBJECTIVE BOX
Incomplete Nephrology Progress Note       SUBJECTIVE:  63-year-old female with past medical history of hypertension, hyperlipidemia, CKD5 who was discharged from [facility] yesterday and was at the hospital for JOSUE over CKD stage 5 got readmitted for headache and palpitations.  Patient with no clinical symptoms of uremia or ESKD.       PAST MEDICAL & SURGICAL HISTORY  Hypertension  Hyperlipemia  No significant past surgical history      SOCIAL HISTORY:  Negative for smoking/alcohol/drug use.     ALLERGIES:  No Known Allergies    MEDICATIONS:  STANDING MEDICATIONS    PRN MEDICATIONS    VITALS:   T(F): 98.6, Max: 98.6 (09-20-23 @ 07:32)  HR: 75 (72 - 75)  BP: 156/78 (156/78 - 171/89)  RR: 18 (16 - 18)  SpO2: 99% (99% - 99%)    LABS:                        9.5    10.26 )-----------( 209      ( 20 Sep 2023 08:42 )             26.4     09-20    121<L>  |  86<L>  |  33<H>  ----------------------------<  114<H>  4.4   |  24  |  4.2<HH>    Ca    8.0<L>      20 Sep 2023 08:42  Mg     1.6     09-20    TPro  6.5  /  Alb  4.3  /  TBili  0.4  /  DBili  x   /  AST  69<H>  /  ALT  41  /  AlkPhos  105  09-20      Urinalysis Basic - ( 20 Sep 2023 08:42 )    Color: x / Appearance: x / SG: x / pH: x  Gluc: 114 mg/dL / Ketone: x  / Bili: x / Urobili: x   Blood: x / Protein: x / Nitrite: x   Leuk Esterase: x / RBC: x / WBC x   Sq Epi: x / Non Sq Epi: x / Bacteria: x        Troponin T, Serum: <0.01 ng/mL (09-20-23 @ 08:42)      CARDIAC MARKERS ( 20 Sep 2023 08:42 )  x     / <0.01 ng/mL / x     / x     / x              PHYSICAL EXAM:  GEN: NAD, comfortable  LUNGS: CTAB, no w/r/r  HEART: RRR, s1 and s2 appreciated, no m/r/g  ABD: soft, NT/ND, +BS  EXT: no edema, PP b/l  NEURO: AAOX3  Nephrology Progress Note       SUBJECTIVE:  63-year-old female with past medical history of hypertension, hyperlipidemia, CKD5 who was discharged from [facility] yesterday and was at the hospital for JOSUE over CKD stage 5 got readmitted for headache and palpitations.  Patient with no clinical symptoms of uremia or ESKD.       PAST MEDICAL & SURGICAL HISTORY  Hypertension  Hyperlipemia  No significant past surgical history      SOCIAL HISTORY:  Negative for smoking/alcohol/drug use.     ALLERGIES:  No Known Allergies    MEDICATIONS:  STANDING MEDICATIONS    PRN MEDICATIONS    VITALS:   T(F): 98.6, Max: 98.6 (09-20-23 @ 07:32)  HR: 75 (72 - 75)  BP: 156/78 (156/78 - 171/89)  RR: 18 (16 - 18)  SpO2: 99% (99% - 99%)    LABS:                        9.5    10.26 )-----------( 209      ( 20 Sep 2023 08:42 )             26.4     09-20    121<L>  |  86<L>  |  33<H>  ----------------------------<  114<H>  4.4   |  24  |  4.2<HH>    Ca    8.0<L>      20 Sep 2023 08:42  Mg     1.6     09-20    TPro  6.5  /  Alb  4.3  /  TBili  0.4  /  DBili  x   /  AST  69<H>  /  ALT  41  /  AlkPhos  105  09-20      Urinalysis Basic - ( 20 Sep 2023 08:42 )    Color: x / Appearance: x / SG: x / pH: x  Gluc: 114 mg/dL / Ketone: x  / Bili: x / Urobili: x   Blood: x / Protein: x / Nitrite: x   Leuk Esterase: x / RBC: x / WBC x   Sq Epi: x / Non Sq Epi: x / Bacteria: x        Troponin T, Serum: <0.01 ng/mL (09-20-23 @ 08:42)      CARDIAC MARKERS ( 20 Sep 2023 08:42 )  x     / <0.01 ng/mL / x     / x     / x              PHYSICAL EXAM:  GEN: NAD, comfortable  LUNGS: CTAB, no w/r/r  HEART: RRR, s1 and s2 appreciated, no m/r/g  ABD: soft, NT/ND, +BS  EXT: no edema, PP b/l  NEURO: AAOX3  Nephrology Progress/ Reconsult  Note       SUBJECTIVE:  63-year-old female with past medical history of hypertension, hyperlipidemia, CKD5 who was discharged from [facility] yesterday and was at the hospital for JOSUE over CKD stage 5 got readmitted for headache and palpitations.  Patient with no clinical symptoms of uremia or ESKD.       PAST MEDICAL & SURGICAL HISTORY  Hypertension  Hyperlipemia  No significant past surgical history      SOCIAL HISTORY:  Negative for smoking/alcohol/drug use.     ALLERGIES:  No Known Allergies    MEDICATIONS:  STANDING MEDICATIONS    PRN MEDICATIONS    VITALS:   T(F): 98.6, Max: 98.6 (09-20-23 @ 07:32)  HR: 75 (72 - 75)  BP: 156/78 (156/78 - 171/89)  RR: 18 (16 - 18)  SpO2: 99% (99% - 99%)    LABS:                        9.5    10.26 )-----------( 209      ( 20 Sep 2023 08:42 )             26.4     09-20    121<L>  |  86<L>  |  33<H>  ----------------------------<  114<H>  4.4   |  24  |  4.2<HH>    Ca    8.0<L>      20 Sep 2023 08:42  Mg     1.6     09-20    TPro  6.5  /  Alb  4.3  /  TBili  0.4  /  DBili  x   /  AST  69<H>  /  ALT  41  /  AlkPhos  105  09-20      Urinalysis Basic - ( 20 Sep 2023 08:42 )    Color: x / Appearance: x / SG: x / pH: x  Gluc: 114 mg/dL / Ketone: x  / Bili: x / Urobili: x   Blood: x / Protein: x / Nitrite: x   Leuk Esterase: x / RBC: x / WBC x   Sq Epi: x / Non Sq Epi: x / Bacteria: x        Troponin T, Serum: <0.01 ng/mL (09-20-23 @ 08:42)      CARDIAC MARKERS ( 20 Sep 2023 08:42 )  x     / <0.01 ng/mL / x     / x     / x              PHYSICAL EXAM:  GEN: NAD, comfortable  LUNGS: CTAB, no w/r/r  HEART: RRR, s1 and s2 appreciated, no m/r/g  ABD: soft, NT/ND, +BS  EXT: no edema, PP b/l  NEURO: AAOX3

## 2023-09-20 NOTE — ED PROVIDER NOTE - CLINICAL SUMMARY MEDICAL DECISION MAKING FREE TEXT BOX
Throughout ED observation period, pt remained clinically and hemodynamically stable.  headache improving, serial neuro exams nml  w/u significant for hyponatremia, acutely lower since dc labs yesterday. will admit for monitoring and correction

## 2023-09-20 NOTE — PROGRESS NOTE ADULT - ATTENDING COMMENTS
Acute on chronic hyponatremia in patient with advanced CKD.    Admits to high fluid intake yesterday after she was discharged.   Ensure pt is on fluid restriction 1 liter daily-- d/w pt and pt's family at bedside  no need for iv hydration  check TSH level  Advanced CKD is stable and can be followed up outpatient

## 2023-09-20 NOTE — ED PROVIDER NOTE - ATTENDING APP SHARED VISIT CONTRIBUTION OF CARE
63-year-old female with PMH HTN, HLD, CKD, recent admission with discharge yesterday for elevated creatinine/potassium--treated and had vein mapping for AV fistula,  pt presents for eval of headache- moderate, throbbing w/ associated intermittent palpitations and nausea. no trauma/syncope. pt med compliant. no focal numbness/weakness.     vss  gen- NAD, aaox3  card-rrr  lungs-ctab, no wheezing or rhonchi  abd-sntnd, no guarding or rebound  neuro- full str/sensation, cn ii-xii grossly intact, normal coordination, normal speech, neck supple

## 2023-09-20 NOTE — H&P ADULT - NSHPPHYSICALEXAM_GEN_ALL_CORE
Vital Signs Last 24 Hrs  T(C): 37 (20 Sep 2023 07:32), Max: 37 (20 Sep 2023 07:32)  T(F): 98.6 (20 Sep 2023 07:32), Max: 98.6 (20 Sep 2023 07:32)  HR: 75 (20 Sep 2023 07:32) (75 - 75)  BP: 156/78 (20 Sep 2023 07:32) (156/78 - 156/78)  BP(mean): --  RR: 18 (20 Sep 2023 07:32) (18 - 18)  SpO2: 99% (20 Sep 2023 07:32) (99% - 99%)    Parameters below as of 20 Sep 2023 07:32  Patient On (Oxygen Delivery Method): room air    PHYSICAL EXAM  GENERAL: NAD  HEAD:  NCAT, EOMI, MMM  NECK: Supple, Nontender  NERVOUS SYSTEM: AAOx3, NFD   CHEST/LUNG: + BS b/l  HEART: +s1s2 RRR  ABDOMEN: soft, NT/ND  EXTREMITIES: pp, no edema  SKIN: No rashes or lesions

## 2023-09-20 NOTE — PATIENT PROFILE ADULT - FALL HARM RISK - HARM RISK INTERVENTIONS

## 2023-09-20 NOTE — H&P ADULT - NSHPLABSRESULTS_GEN_ALL_CORE
Labs:                        9.5    10.26 )-----------( 209      ( 20 Sep 2023 08:42 )             26.4     124<L>  |  91<L>  |  31<H>  ----------------------------<  119<H>  4.6   |  23  |  4.1<HH>    Ca    8.0<L>      20 Sep 2023 11:33  Phos  4.8     09-20  Mg     1.6     09-20    TPro  6.2  /  Alb  4.1  /  TBili  0.4  /  DBili  x   /  AST  69<H>  /  ALT  42<H>  /  AlkPhos  101  09-20                             Urinalysis Basic - ( 20 Sep 2023 11:33 )  Color: x / Appearance: x / SG: x / pH: x  Gluc: 119 mg/dL / Ketone: x  / Bili: x / Urobili: x   Blood: x / Protein: x / Nitrite: x   Leuk Esterase: x / RBC: x / WBC x   Sq Epi: x / Non Sq Epi: x / Bacteria: x    Culture - Urine (collected 09-15-23 @ 19:17)  Source: Clean Catch Clean Catch (Midstream)  Final Report (09-16-23 @ 23:50):    No growth    Troponin T, Serum: <0.01 ng/mL (09-20-23 @ 08:42)

## 2023-09-20 NOTE — ED PROVIDER NOTE - PHYSICAL EXAMINATION
PHYSICAL EXAM:    GENERAL: Alert, appears stated age, well appearing, non-toxic  SKIN: Warm, and dry.  HEAD: NC, AT  EYE: Normal lids/conjunctiva, PERRL, EOMI  ENT: Normal hearing, patent oropharynx  NECK: +supple. No meningismus, or JVD   Pulm: Bilateral BS, normal resp effort, no wheezes, stridor, or retractions  CV: RRR, no M/R/G, 2+and = radial pulses  Abd: soft, non-tender, non-distended, no rebound/guarding.   Mskel: no erythema, cyanosis, edema. no calf tenderness  Neuro: AAOx3, no sensory/motor deficits, CN 2-12 intact. No speech slurring, pronator drift, facial asymmetry. normal finger-to-nose b/l. 5/5 strength throughout. normal gait. negative romberg.

## 2023-09-20 NOTE — H&P ADULT - HISTORY OF PRESENT ILLNESS
62 yo F w h/o HTN, HLD, CKD5 presents for evaluation of headache and palpitations. Of note, patient was discharged from [facility] yesterday - patient was admitted for vein mapping for AVF.     In ED, T 98.6, /78, HR 75, RR 18, SpO2 99% on RA; Labs significant for Na 121.     Patient subsequently admitted for hyponatremia.  64 yo F w h/o HTN, HLD, CKD5 presents for evaluation of headache and palpitations. Of note, patient was discharged from [facility] yesterday - patient was admitted for vein mapping for AVF. Since being home patient endorses increased water consumption.    In ED, T 98.6, /78, HR 75, RR 18, SpO2 99% on RA; Labs significant for Na 121.     Patient subsequently admitted for hyponatremia.

## 2023-09-20 NOTE — ED ADULT NURSE NOTE - OBJECTIVE STATEMENT
63 year old female. Complaint of headache and heart palpitations since yesterday. Patient denies chest pain, SOB, fever.

## 2023-09-20 NOTE — ED PROVIDER NOTE - NS ED ATTENDING STATEMENT MOD
This was a shared visit with the JIMMIE. I reviewed and verified the documentation and independently performed the documented:

## 2023-09-21 LAB
24R-OH-CALCIDIOL SERPL-MCNC: 8 NG/ML — LOW (ref 30–80)
ALBUMIN SERPL ELPH-MCNC: 3.8 G/DL — SIGNIFICANT CHANGE UP (ref 3.5–5.2)
ALBUMIN SERPL ELPH-MCNC: 3.9 G/DL — SIGNIFICANT CHANGE UP (ref 3.5–5.2)
ALP SERPL-CCNC: 101 U/L — SIGNIFICANT CHANGE UP (ref 30–115)
ALP SERPL-CCNC: 98 U/L — SIGNIFICANT CHANGE UP (ref 30–115)
ALT FLD-CCNC: 75 U/L — HIGH (ref 0–41)
ALT FLD-CCNC: 95 U/L — HIGH (ref 0–41)
ANION GAP SERPL CALC-SCNC: 12 MMOL/L — SIGNIFICANT CHANGE UP (ref 7–14)
ANION GAP SERPL CALC-SCNC: 13 MMOL/L — SIGNIFICANT CHANGE UP (ref 7–14)
ANION GAP SERPL CALC-SCNC: 15 MMOL/L — HIGH (ref 7–14)
AST SERPL-CCNC: 123 U/L — HIGH (ref 0–41)
AST SERPL-CCNC: 142 U/L — HIGH (ref 0–41)
BASOPHILS # BLD AUTO: 0.03 K/UL — SIGNIFICANT CHANGE UP (ref 0–0.2)
BASOPHILS NFR BLD AUTO: 0.4 % — SIGNIFICANT CHANGE UP (ref 0–1)
BILIRUB SERPL-MCNC: 0.3 MG/DL — SIGNIFICANT CHANGE UP (ref 0.2–1.2)
BILIRUB SERPL-MCNC: <0.2 MG/DL — SIGNIFICANT CHANGE UP (ref 0.2–1.2)
BUN SERPL-MCNC: 37 MG/DL — HIGH (ref 10–20)
BUN SERPL-MCNC: 40 MG/DL — HIGH (ref 10–20)
BUN SERPL-MCNC: 50 MG/DL — HIGH (ref 10–20)
CALCIUM SERPL-MCNC: 8 MG/DL — LOW (ref 8.4–10.4)
CALCIUM SERPL-MCNC: 8 MG/DL — LOW (ref 8.4–10.5)
CALCIUM SERPL-MCNC: 8.1 MG/DL — LOW (ref 8.4–10.5)
CALCIUM SERPL-MCNC: 8.3 MG/DL — LOW (ref 8.4–10.5)
CALCIUM SERPL-MCNC: 8.5 MG/DL — SIGNIFICANT CHANGE UP (ref 8.4–10.5)
CHLORIDE SERPL-SCNC: 95 MMOL/L — LOW (ref 98–110)
CHLORIDE SERPL-SCNC: 95 MMOL/L — LOW (ref 98–110)
CHLORIDE SERPL-SCNC: 96 MMOL/L — LOW (ref 98–110)
CO2 SERPL-SCNC: 22 MMOL/L — SIGNIFICANT CHANGE UP (ref 17–32)
CO2 SERPL-SCNC: 23 MMOL/L — SIGNIFICANT CHANGE UP (ref 17–32)
CO2 SERPL-SCNC: 24 MMOL/L — SIGNIFICANT CHANGE UP (ref 17–32)
CREAT SERPL-MCNC: 4.3 MG/DL — CRITICAL HIGH (ref 0.7–1.5)
CREAT SERPL-MCNC: 4.5 MG/DL — CRITICAL HIGH (ref 0.7–1.5)
CREAT SERPL-MCNC: 4.8 MG/DL — CRITICAL HIGH (ref 0.7–1.5)
EGFR: 10 ML/MIN/1.73M2 — LOW
EGFR: 10 ML/MIN/1.73M2 — LOW
EGFR: 11 ML/MIN/1.73M2 — LOW
EOSINOPHIL # BLD AUTO: 0.15 K/UL — SIGNIFICANT CHANGE UP (ref 0–0.7)
EOSINOPHIL NFR BLD AUTO: 1.9 % — SIGNIFICANT CHANGE UP (ref 0–8)
GLUCOSE SERPL-MCNC: 101 MG/DL — HIGH (ref 70–99)
GLUCOSE SERPL-MCNC: 101 MG/DL — HIGH (ref 70–99)
GLUCOSE SERPL-MCNC: 99 MG/DL — SIGNIFICANT CHANGE UP (ref 70–99)
HCT VFR BLD CALC: 27.2 % — LOW (ref 37–47)
HGB BLD-MCNC: 9.4 G/DL — LOW (ref 12–16)
IMM GRANULOCYTES NFR BLD AUTO: 0.4 % — HIGH (ref 0.1–0.3)
LYMPHOCYTES # BLD AUTO: 1.68 K/UL — SIGNIFICANT CHANGE UP (ref 1.2–3.4)
LYMPHOCYTES # BLD AUTO: 20.9 % — SIGNIFICANT CHANGE UP (ref 20.5–51.1)
MAGNESIUM SERPL-MCNC: 2.8 MG/DL — HIGH (ref 1.8–2.4)
MCHC RBC-ENTMCNC: 29.7 PG — SIGNIFICANT CHANGE UP (ref 27–31)
MCHC RBC-ENTMCNC: 34.6 G/DL — SIGNIFICANT CHANGE UP (ref 32–37)
MCV RBC AUTO: 86.1 FL — SIGNIFICANT CHANGE UP (ref 81–99)
MONOCYTES # BLD AUTO: 0.62 K/UL — HIGH (ref 0.1–0.6)
MONOCYTES NFR BLD AUTO: 7.7 % — SIGNIFICANT CHANGE UP (ref 1.7–9.3)
NEUTROPHILS # BLD AUTO: 5.54 K/UL — SIGNIFICANT CHANGE UP (ref 1.4–6.5)
NEUTROPHILS NFR BLD AUTO: 68.7 % — SIGNIFICANT CHANGE UP (ref 42.2–75.2)
NRBC # BLD: 0 /100 WBCS — SIGNIFICANT CHANGE UP (ref 0–0)
PHOSPHATE SERPL-MCNC: 5.9 MG/DL — HIGH (ref 2.1–4.9)
PHOSPHATE SERPL-MCNC: 6.2 MG/DL — HIGH (ref 2.1–4.9)
PLATELET # BLD AUTO: 217 K/UL — SIGNIFICANT CHANGE UP (ref 130–400)
PMV BLD: 10.7 FL — HIGH (ref 7.4–10.4)
POTASSIUM SERPL-MCNC: 4.3 MMOL/L — SIGNIFICANT CHANGE UP (ref 3.5–5)
POTASSIUM SERPL-MCNC: 4.5 MMOL/L — SIGNIFICANT CHANGE UP (ref 3.5–5)
POTASSIUM SERPL-MCNC: 4.6 MMOL/L — SIGNIFICANT CHANGE UP (ref 3.5–5)
POTASSIUM SERPL-SCNC: 4.3 MMOL/L — SIGNIFICANT CHANGE UP (ref 3.5–5)
POTASSIUM SERPL-SCNC: 4.5 MMOL/L — SIGNIFICANT CHANGE UP (ref 3.5–5)
POTASSIUM SERPL-SCNC: 4.6 MMOL/L — SIGNIFICANT CHANGE UP (ref 3.5–5)
PROT ?TM UR-MCNC: 56 MG/DLG/24H — SIGNIFICANT CHANGE UP
PROT SERPL-MCNC: 6.2 G/DL — SIGNIFICANT CHANGE UP (ref 6–8)
PROT SERPL-MCNC: 6.4 G/DL — SIGNIFICANT CHANGE UP (ref 6–8)
PTH-INTACT FLD-MCNC: 174 PG/ML — HIGH (ref 15–65)
PTH-INTACT FLD-MCNC: 210 PG/ML — HIGH (ref 15–65)
RBC # BLD: 3.16 M/UL — LOW (ref 4.2–5.4)
RBC # FLD: 11.9 % — SIGNIFICANT CHANGE UP (ref 11.5–14.5)
SODIUM SERPL-SCNC: 130 MMOL/L — LOW (ref 135–146)
SODIUM SERPL-SCNC: 132 MMOL/L — LOW (ref 135–146)
SODIUM SERPL-SCNC: 133 MMOL/L — LOW (ref 135–146)
TSH SERPL-MCNC: 5.34 UIU/ML — HIGH (ref 0.27–4.2)
URATE SERPL-MCNC: 8 MG/DL — HIGH (ref 2.5–7)
UUN UR-MCNC: <6 MG/DL — SIGNIFICANT CHANGE UP
WBC # BLD: 8.05 K/UL — SIGNIFICANT CHANGE UP (ref 4.8–10.8)
WBC # FLD AUTO: 8.05 K/UL — SIGNIFICANT CHANGE UP (ref 4.8–10.8)

## 2023-09-21 PROCEDURE — 99233 SBSQ HOSP IP/OBS HIGH 50: CPT

## 2023-09-21 PROCEDURE — 76705 ECHO EXAM OF ABDOMEN: CPT | Mod: 26

## 2023-09-21 RX ORDER — CALCIUM ACETATE 667 MG
667 TABLET ORAL
Refills: 0 | Status: DISCONTINUED | OUTPATIENT
Start: 2023-09-21 | End: 2023-09-25

## 2023-09-21 RX ORDER — CALCIUM ACETATE 667 MG
667 TABLET ORAL
Refills: 0 | Status: DISCONTINUED | OUTPATIENT
Start: 2023-09-21 | End: 2023-09-21

## 2023-09-21 RX ADMIN — Medication 667 MILLIGRAM(S): at 17:35

## 2023-09-21 RX ADMIN — CHLORHEXIDINE GLUCONATE 1 APPLICATION(S): 213 SOLUTION TOPICAL at 05:23

## 2023-09-21 RX ADMIN — HEPARIN SODIUM 5000 UNIT(S): 5000 INJECTION INTRAVENOUS; SUBCUTANEOUS at 21:28

## 2023-09-21 RX ADMIN — Medication 650 MILLIGRAM(S): at 21:28

## 2023-09-21 RX ADMIN — Medication 650 MILLIGRAM(S): at 05:19

## 2023-09-21 RX ADMIN — Medication 60 MILLIGRAM(S): at 05:19

## 2023-09-21 RX ADMIN — HEPARIN SODIUM 5000 UNIT(S): 5000 INJECTION INTRAVENOUS; SUBCUTANEOUS at 05:19

## 2023-09-21 RX ADMIN — SENNA PLUS 2 TABLET(S): 8.6 TABLET ORAL at 13:21

## 2023-09-21 RX ADMIN — Medication 667 MILLIGRAM(S): at 21:27

## 2023-09-21 NOTE — PROGRESS NOTE ADULT - SUBJECTIVE AND OBJECTIVE BOX
Nephrology progress note    THIS IS AN INCOMPLETE NOTE . FULL NOTE TO FOLLOW SHORTLY    Patient is seen and examined, events over the last 24 h noted .    Allergies:  No Known Allergies    Hospital Medications:   MEDICATIONS  (STANDING):  chlorhexidine 2% Cloths 1 Application(s) Topical <User Schedule>  cyanocobalamin 1000 MICROGram(s) Oral daily  heparin   Injectable 5000 Unit(s) SubCutaneous every 8 hours  influenza   Vaccine 0.5 milliLiter(s) IntraMuscular once  NIFEdipine XL 60 milliGRAM(s) Oral daily  simvastatin 10 milliGRAM(s) Oral at bedtime  sodium bicarbonate 650 milliGRAM(s) Oral every 8 hours        VITALS:  T(F): 97.6 (09-21-23 @ 05:22), Max: 99.1 (09-20-23 @ 16:54)  HR: 65 (09-21-23 @ 05:22)  BP: 136/74 (09-21-23 @ 05:22)  RR: 18 (09-21-23 @ 05:22)  SpO2: 99% (09-20-23 @ 16:54)  Wt(kg): --        PHYSICAL EXAM:  Constitutional: NAD  HEENT: anicteric sclera, oropharynx clear, MMM  Neck: No JVD  Respiratory: CTAB, no wheezes, rales or rhonchi  Cardiovascular: S1, S2, RRR  Gastrointestinal: BS+, soft, NT/ND  Extremities: No cyanosis or clubbing. No peripheral edema  :  No lane.   Skin: No rashes    LABS:  09-21    133<L>  |  96<L>  |  40<H>  ----------------------------<  99  4.3   |  24  |  4.5<HH>    Ca    8.3<L>      21 Sep 2023 06:28  Phos  6.2     09-21  Mg     2.8     09-21    TPro  6.2  /  Alb  3.9  /  TBili  0.3  /  DBili      /  AST  123<H>  /  ALT  75<H>  /  AlkPhos  98  09-21                          9.4    8.05  )-----------( 217      ( 21 Sep 2023 06:28 )             27.2       Urine Studies:  Urinalysis Basic - ( 21 Sep 2023 06:28 )    Color:  / Appearance:  / SG:  / pH:   Gluc: 99 mg/dL / Ketone:   / Bili:  / Urobili:    Blood:  / Protein:  / Nitrite:    Leuk Esterase:  / RBC:  / WBC    Sq Epi:  / Non Sq Epi:  / Bacteria:       Osmolality, Random Urine: 112 mos/kg (09-20 @ 12:30)  Sodium, Random Urine: 37.0 mmoL/L (09-20 @ 12:30)  Potassium, Random Urine: 5 mmol/L (09-20 @ 12:30)  Chloride, Random Urine: 23 (09-20 @ 12:30)      Iron 101, TIBC 233, %sat 43      [09-16-23 @ 08:16]  Ferritin 36      [09-16-23 @ 08:16]  PTH -- (Ca 8.1)      [09-20-23 @ 11:33]   210  Vitamin D (25OH) 8      [09-20-23 @ 17:24]  Lipid: chol 154, TG 70, HDL 80, LDL --      [09-20-23 @ 17:24]    HBsAg Nonreact      [04-15-22 @ 04:30]  HCV 0.07, Nonreact      [04-15-22 @ 04:30]    ALBERTO: titer Negative, pattern --      [04-15-22 @ 04:30]  C3 Complement 127      [04-15-22 @ 04:30]  C4 Complement 31      [04-15-22 @ 04:30]  ANCA: cANCA Negative, pANCA Negative, atypical ANCA Indeterminate Method interference due to ALBERTO Fluorescence      [04-15-22 @ 04:30]  Free Light Chains: kappa 6.54, lambda 4.34, ratio = 1.51      [04-15 @ 04:30]  Immunofixation Serum:   No Monoclonal Band Identified    Reference Range: None Detected      [04-15-22 @ 04:30]  SPEP Interpretation: Normal Electrophoresis Pattern      [04-15-22 @ 04:30]  Immunofixation Urine: Reference Range: None Detected      [04-14-22 @ 19:26]      RADIOLOGY & ADDITIONAL STUDIES:   Nephrology progress note    Patient is seen and examined, events over the last 24 h noted .  Lying in bed   had constipation    Allergies:  No Known Allergies    Hospital Medications:   MEDICATIONS  (STANDING):  chlorhexidine 2% Cloths 1 Application(s) Topical <User Schedule>  cyanocobalamin 1000 MICROGram(s) Oral daily  heparin   Injectable 5000 Unit(s) SubCutaneous every 8 hours  influenza   Vaccine 0.5 milliLiter(s) IntraMuscular once  NIFEdipine XL 60 milliGRAM(s) Oral daily  simvastatin 10 milliGRAM(s) Oral at bedtime  sodium bicarbonate 650 milliGRAM(s) Oral every 8 hours        VITALS:  T(F): 97.6 (09-21-23 @ 05:22), Max: 99.1 (09-20-23 @ 16:54)  HR: 65 (09-21-23 @ 05:22)  BP: 136/74 (09-21-23 @ 05:22)  RR: 18 (09-21-23 @ 05:22)  SpO2: 99% (09-20-23 @ 16:54)  Wt(kg): --        PHYSICAL EXAM:  Constitutional: NAD  Respiratory: CTAB,   Cardiovascular: S1, S2, RRR  Gastrointestinal: BS+, soft, NT/ND  Extremities: No cyanosis or clubbing. No peripheral edema  :  No lane.   Skin: No rashes    LABS:  09-21    133<L>  |  96<L>  |  40<H>  ----------------------------<  99  4.3   |  24  |  4.5<HH>    Ca    8.3<L>      21 Sep 2023 06:28  Phos  6.2     09-21  Mg     2.8     09-21    TPro  6.2  /  Alb  3.9  /  TBili  0.3  /  DBili      /  AST  123<H>  /  ALT  75<H>  /  AlkPhos  98  09-21                          9.4    8.05  )-----------( 217      ( 21 Sep 2023 06:28 )             27.2       Urine Studies:  Urinalysis Basic - ( 21 Sep 2023 06:28 )    Color:  / Appearance:  / SG:  / pH:   Gluc: 99 mg/dL / Ketone:   / Bili:  / Urobili:    Blood:  / Protein:  / Nitrite:    Leuk Esterase:  / RBC:  / WBC    Sq Epi:  / Non Sq Epi:  / Bacteria:       Osmolality, Random Urine: 112 mos/kg (09-20 @ 12:30)  Sodium, Random Urine: 37.0 mmoL/L (09-20 @ 12:30)  Potassium, Random Urine: 5 mmol/L (09-20 @ 12:30)  Chloride, Random Urine: 23 (09-20 @ 12:30)      Iron 101, TIBC 233, %sat 43      [09-16-23 @ 08:16]  Ferritin 36      [09-16-23 @ 08:16]  PTH -- (Ca 8.1)      [09-20-23 @ 11:33]   210  Vitamin D (25OH) 8      [09-20-23 @ 17:24]  Lipid: chol 154, TG 70, HDL 80, LDL --      [09-20-23 @ 17:24]    HBsAg Nonreact      [04-15-22 @ 04:30]  HCV 0.07, Nonreact      [04-15-22 @ 04:30]    ALBERTO: titer Negative, pattern --      [04-15-22 @ 04:30]  C3 Complement 127      [04-15-22 @ 04:30]  C4 Complement 31      [04-15-22 @ 04:30]  ANCA: cANCA Negative, pANCA Negative, atypical ANCA Indeterminate Method interference due to ALBERTO Fluorescence      [04-15-22 @ 04:30]  Free Light Chains: kappa 6.54, lambda 4.34, ratio = 1.51      [04-15 @ 04:30]  Immunofixation Serum:   No Monoclonal Band Identified    Reference Range: None Detected      [04-15-22 @ 04:30]  SPEP Interpretation: Normal Electrophoresis Pattern      [04-15-22 @ 04:30]  Immunofixation Urine: Reference Range: None Detected      [04-14-22 @ 19:26]      RADIOLOGY & ADDITIONAL STUDIES:

## 2023-09-21 NOTE — PROGRESS NOTE ADULT - ASSESSMENT
63-year-old female with past medical history of hypertension, hyperlipidemia, CKD5 who was discharged from [facility] yesterday and was at the hospital for JOSUE over CKD stage 5 got readmitted for headache and palpitations.  Patient with no clinical symptoms of uremia or ESKD.     Assessment and plan    CKD stage 5/ Headache/Palpitations/ Moderate hyponatremia polydipsia   - Potassium under control- 4.4. On Lokelma 10 mg Q 24 / on DC will need KAyexalate 15 g po q24h   - Bicarbonate levels- 24 on sodium bicarbonate 650 mg TID cont   - Blood pressure under control- on nifedipine ER 60 mg once daily  - Anemia of ESRD - Hbg 9.5. Not on REMA/ no need yet    - MBD- IP noted start phoslo one tab po qac   - Was supposed to follow up after recent discharge for vein mapping / reluctant to start HD now   - CT head negative   - OP renal follow up on DC in my office

## 2023-09-21 NOTE — PROGRESS NOTE ADULT - ASSESSMENT
64 yo F w h/o HTN, HLD, CKD5 presents for evaluation of headache and palpitations 2 days after being discharged from hospital.  Admitted for hyponatremia and worsening renal function to medicine service.      # Hyposmolar Hyponatremia   # CKD 5   - Na on admission 121-- was 133 on discharge day prior--now back to Na 133   -monitor bmp closely q6hr (avoid over correction)  -continue fluid restriction 1L/day  -nephrology following  -f/u urine studies  -elevated TSH 5.34 ---check am t3/t4   -consideration for RRT (patient was to undergo vein mapping as outaptient for fistula     #Transaminitis  -lft doubled in 24 hrs  -repeat cmp  -hold statin  -check RUQ US and hepatitis serology    #HTN-stable  -cont nifedipine xl 60 mg daily    #HLD - holding statin 2/2 transaminitis   -check lipid profile    #Anemia of Chronic disease  -stable---hb 9.4  -monitor cbc      DVT/GI ppx  guarded prognosis    FULL CODE    Total time spent to complete patient's bedside assessment, review medical chart, discuss medical plan of care with covering medical team was more than 50 minutes  with >50% of time spendt face to face with patient, discussion with patient/family and/or coordination of care    #Progress Note Handoff  Pending (specify):  monitor na, nephrology following- RUQ us and trend lft  Family discussion: d/w patient and daughter bedside  Disposition: Home_x_ (anticipate likely dc 24-48 hrs)

## 2023-09-21 NOTE — PROGRESS NOTE ADULT - SUBJECTIVE AND OBJECTIVE BOX
Patient is a 63y old  Female who presents with a chief complaint of hyponatremia (21 Sep 2023 15:07)    HPI:  62 yo F w h/o HTN, HLD, CKD5 presents for evaluation of headache and palpitations. Of note, patient was discharged from [facility] yesterday - patient was admitted for vein mapping for AVF. Since being home patient endorses increased water consumption.    In ED, T 98.6, /78, HR 75, RR 18, SpO2 99% on RA; Labs significant for Na 121.     Patient subsequently admitted for hyponatremia.  (20 Sep 2023 14:00)    PAST MEDICAL & SURGICAL HISTORY:  Hypertension  Hyperlipemia  No significant past surgical history    patient seen and examined independently on morning rounds for first time today, chart reviewed and discussed with the medicine resident and on interdisciplinary rounds.    hospital day #1  reports feeling better today- daughter bedside- asking about amount of fluid intake and endorses patient drinks a lot of tea daily     Vital Signs Last 24 Hrs  T(C): 36.4 (21 Sep 2023 05:22), Max: 37.3 (20 Sep 2023 16:54)  T(F): 97.6 (21 Sep 2023 05:22), Max: 99.1 (20 Sep 2023 16:54)  HR: 65 (21 Sep 2023 05:22) (64 - 71)  BP: 136/74 (21 Sep 2023 05:22) (119/74 - 163/83)  BP(mean): 91 (20 Sep 2023 20:42) (91 - 99)  RR: 18 (21 Sep 2023 05:22) (16 - 18)  SpO2: 99% (20 Sep 2023 16:54) (99% - 99%)    Parameters below as of 20 Sep 2023 16:54  Patient On (Oxygen Delivery Method): room air    PE:  GEN-NAD, AAOx3, Albanian speaking  PULM- Clear to auscultation bilaterally, fair air entry  CVS- +s1/s2 RRR  GI- soft NT ND +bs, no rebound, no guarding  EXT- no edema  SKIN- no rashes/no lesions  NEURO- nonfocal                        9.4    8.05  )-----------( 217      ( 21 Sep 2023 06:28 )             27.2     09-21    133<L>  |  96<L>  |  40<H>  ----------------------------<  99  4.3   |  24  |  4.5<HH>    Ca    8.3<L>      21 Sep 2023 06:28  Phos  6.2     09-21  Mg     2.8     09-21    TPro  6.2  /  Alb  3.9  /  TBili  0.3  /  DBili  x   /  AST  123<H>  /  ALT  75<H>  /  AlkPhos  98  09-21    CARDIAC MARKERS ( 20 Sep 2023 17:24 )  x     / <0.01 ng/mL / x     / x     / x      CARDIAC MARKERS ( 20 Sep 2023 08:42 )  x     / <0.01 ng/mL / x     / x     / x          Urinalysis Basic - ( 21 Sep 2023 06:28 )    Color: x / Appearance: x / SG: x / pH: x  Gluc: 99 mg/dL / Ketone: x  / Bili: x / Urobili: x   Blood: x / Protein: x / Nitrite: x   Leuk Esterase: x / RBC: x / WBC x   Sq Epi: x / Non Sq Epi: x / Bacteria: x          MEDICATIONS  (STANDING):  calcium acetate 667 milliGRAM(s) Oral three times a day with meals  chlorhexidine 2% Cloths 1 Application(s) Topical <User Schedule>  cyanocobalamin 1000 MICROGram(s) Oral daily  heparin   Injectable 5000 Unit(s) SubCutaneous every 8 hours  influenza   Vaccine 0.5 milliLiter(s) IntraMuscular once  NIFEdipine XL 60 milliGRAM(s) Oral daily  sodium bicarbonate 650 milliGRAM(s) Oral every 8 hours

## 2023-09-22 DIAGNOSIS — E87.20 ACIDOSIS, UNSPECIFIED: ICD-10-CM

## 2023-09-22 DIAGNOSIS — N17.9 ACUTE KIDNEY FAILURE, UNSPECIFIED: ICD-10-CM

## 2023-09-22 DIAGNOSIS — I12.0 HYPERTENSIVE CHRONIC KIDNEY DISEASE WITH STAGE 5 CHRONIC KIDNEY DISEASE OR END STAGE RENAL DISEASE: ICD-10-CM

## 2023-09-22 DIAGNOSIS — D63.1 ANEMIA IN CHRONIC KIDNEY DISEASE: ICD-10-CM

## 2023-09-22 DIAGNOSIS — E78.5 HYPERLIPIDEMIA, UNSPECIFIED: ICD-10-CM

## 2023-09-22 DIAGNOSIS — E87.5 HYPERKALEMIA: ICD-10-CM

## 2023-09-22 DIAGNOSIS — E87.1 HYPO-OSMOLALITY AND HYPONATREMIA: ICD-10-CM

## 2023-09-22 DIAGNOSIS — N18.5 CHRONIC KIDNEY DISEASE, STAGE 5: ICD-10-CM

## 2023-09-22 DIAGNOSIS — E53.8 DEFICIENCY OF OTHER SPECIFIED B GROUP VITAMINS: ICD-10-CM

## 2023-09-22 LAB
ALBUMIN SERPL ELPH-MCNC: 4.3 G/DL — SIGNIFICANT CHANGE UP (ref 3.5–5.2)
ALP SERPL-CCNC: 114 U/L — SIGNIFICANT CHANGE UP (ref 30–115)
ALT FLD-CCNC: 127 U/L — HIGH (ref 0–41)
ANION GAP SERPL CALC-SCNC: 14 MMOL/L — SIGNIFICANT CHANGE UP (ref 7–14)
APPEARANCE UR: CLEAR — SIGNIFICANT CHANGE UP
AST SERPL-CCNC: 162 U/L — HIGH (ref 0–41)
BACTERIA # UR AUTO: NEGATIVE /HPF — SIGNIFICANT CHANGE UP
BASOPHILS # BLD AUTO: 0.04 K/UL — SIGNIFICANT CHANGE UP (ref 0–0.2)
BASOPHILS NFR BLD AUTO: 0.4 % — SIGNIFICANT CHANGE UP (ref 0–1)
BILIRUB SERPL-MCNC: 0.2 MG/DL — SIGNIFICANT CHANGE UP (ref 0.2–1.2)
BILIRUB UR-MCNC: NEGATIVE — SIGNIFICANT CHANGE UP
BUN SERPL-MCNC: 50 MG/DL — HIGH (ref 10–20)
CALCIUM SERPL-MCNC: 8.7 MG/DL — SIGNIFICANT CHANGE UP (ref 8.4–10.5)
CAST: 0 /LPF — SIGNIFICANT CHANGE UP (ref 0–4)
CHLORIDE SERPL-SCNC: 101 MMOL/L — SIGNIFICANT CHANGE UP (ref 98–110)
CO2 SERPL-SCNC: 25 MMOL/L — SIGNIFICANT CHANGE UP (ref 17–32)
COLOR SPEC: YELLOW — SIGNIFICANT CHANGE UP
CORTIS AM PEAK SERPL-MCNC: 14.6 UG/DL — SIGNIFICANT CHANGE UP (ref 6–18.4)
CREAT SERPL-MCNC: 4.6 MG/DL — CRITICAL HIGH (ref 0.7–1.5)
DIFF PNL FLD: ABNORMAL
EGFR: 10 ML/MIN/1.73M2 — LOW
EOSINOPHIL # BLD AUTO: 0.2 K/UL — SIGNIFICANT CHANGE UP (ref 0–0.7)
EOSINOPHIL NFR BLD AUTO: 2 % — SIGNIFICANT CHANGE UP (ref 0–8)
GLUCOSE SERPL-MCNC: 101 MG/DL — HIGH (ref 70–99)
GLUCOSE UR QL: NEGATIVE MG/DL — SIGNIFICANT CHANGE UP
HAV IGM SER-ACNC: SIGNIFICANT CHANGE UP
HBV CORE IGM SER-ACNC: SIGNIFICANT CHANGE UP
HBV SURFACE AG SER-ACNC: SIGNIFICANT CHANGE UP
HCT VFR BLD CALC: 30.3 % — LOW (ref 37–47)
HCV AB S/CO SERPL IA: 0.05 S/CO — SIGNIFICANT CHANGE UP (ref 0–0.99)
HCV AB SERPL-IMP: SIGNIFICANT CHANGE UP
HGB BLD-MCNC: 10.2 G/DL — LOW (ref 12–16)
IMM GRANULOCYTES NFR BLD AUTO: 0.3 % — SIGNIFICANT CHANGE UP (ref 0.1–0.3)
INR BLD: 0.9 RATIO — SIGNIFICANT CHANGE UP (ref 0.65–1.3)
KETONES UR-MCNC: NEGATIVE MG/DL — SIGNIFICANT CHANGE UP
LEUKOCYTE ESTERASE UR-ACNC: NEGATIVE — SIGNIFICANT CHANGE UP
LYMPHOCYTES # BLD AUTO: 2.98 K/UL — SIGNIFICANT CHANGE UP (ref 1.2–3.4)
LYMPHOCYTES # BLD AUTO: 30.5 % — SIGNIFICANT CHANGE UP (ref 20.5–51.1)
MAGNESIUM SERPL-MCNC: 3 MG/DL — HIGH (ref 1.8–2.4)
MCHC RBC-ENTMCNC: 29.8 PG — SIGNIFICANT CHANGE UP (ref 27–31)
MCHC RBC-ENTMCNC: 33.7 G/DL — SIGNIFICANT CHANGE UP (ref 32–37)
MCV RBC AUTO: 88.6 FL — SIGNIFICANT CHANGE UP (ref 81–99)
MONOCYTES # BLD AUTO: 0.66 K/UL — HIGH (ref 0.1–0.6)
MONOCYTES NFR BLD AUTO: 6.8 % — SIGNIFICANT CHANGE UP (ref 1.7–9.3)
NEUTROPHILS # BLD AUTO: 5.86 K/UL — SIGNIFICANT CHANGE UP (ref 1.4–6.5)
NEUTROPHILS NFR BLD AUTO: 60 % — SIGNIFICANT CHANGE UP (ref 42.2–75.2)
NITRITE UR-MCNC: NEGATIVE — SIGNIFICANT CHANGE UP
NRBC # BLD: 0 /100 WBCS — SIGNIFICANT CHANGE UP (ref 0–0)
PH UR: 7.5 — SIGNIFICANT CHANGE UP (ref 5–8)
PHOSPHATE SERPL-MCNC: 6.1 MG/DL — HIGH (ref 2.1–4.9)
PLATELET # BLD AUTO: 250 K/UL — SIGNIFICANT CHANGE UP (ref 130–400)
PMV BLD: 10.8 FL — HIGH (ref 7.4–10.4)
POTASSIUM SERPL-MCNC: 4.5 MMOL/L — SIGNIFICANT CHANGE UP (ref 3.5–5)
POTASSIUM SERPL-SCNC: 4.5 MMOL/L — SIGNIFICANT CHANGE UP (ref 3.5–5)
PROT SERPL-MCNC: 7 G/DL — SIGNIFICANT CHANGE UP (ref 6–8)
PROT UR-MCNC: 300 MG/DL
PROTHROM AB SERPL-ACNC: 10.2 SEC — SIGNIFICANT CHANGE UP (ref 9.95–12.87)
RBC # BLD: 3.42 M/UL — LOW (ref 4.2–5.4)
RBC # FLD: 12.1 % — SIGNIFICANT CHANGE UP (ref 11.5–14.5)
RBC CASTS # UR COMP ASSIST: 8 /HPF — HIGH (ref 0–4)
SODIUM SERPL-SCNC: 140 MMOL/L — SIGNIFICANT CHANGE UP (ref 135–146)
SP GR SPEC: 1.01 — SIGNIFICANT CHANGE UP (ref 1–1.03)
SQUAMOUS # UR AUTO: 1 /HPF — SIGNIFICANT CHANGE UP (ref 0–5)
T3 SERPL-MCNC: 98 NG/DL — SIGNIFICANT CHANGE UP (ref 80–200)
T4 AB SER-ACNC: 10.1 UG/DL — SIGNIFICANT CHANGE UP (ref 4.6–12)
TSH SERPL-MCNC: 2.4 UIU/ML — SIGNIFICANT CHANGE UP (ref 0.27–4.2)
UROBILINOGEN FLD QL: 0.2 MG/DL — SIGNIFICANT CHANGE UP (ref 0.2–1)
WBC # BLD: 9.77 K/UL — SIGNIFICANT CHANGE UP (ref 4.8–10.8)
WBC # FLD AUTO: 9.77 K/UL — SIGNIFICANT CHANGE UP (ref 4.8–10.8)
WBC UR QL: 1 /HPF — SIGNIFICANT CHANGE UP (ref 0–5)

## 2023-09-22 PROCEDURE — 99232 SBSQ HOSP IP/OBS MODERATE 35: CPT

## 2023-09-22 RX ADMIN — Medication 650 MILLIGRAM(S): at 21:34

## 2023-09-22 RX ADMIN — HEPARIN SODIUM 5000 UNIT(S): 5000 INJECTION INTRAVENOUS; SUBCUTANEOUS at 14:24

## 2023-09-22 RX ADMIN — HEPARIN SODIUM 5000 UNIT(S): 5000 INJECTION INTRAVENOUS; SUBCUTANEOUS at 21:34

## 2023-09-22 RX ADMIN — Medication 667 MILLIGRAM(S): at 17:48

## 2023-09-22 RX ADMIN — Medication 667 MILLIGRAM(S): at 11:59

## 2023-09-22 RX ADMIN — Medication 650 MILLIGRAM(S): at 05:04

## 2023-09-22 RX ADMIN — Medication 667 MILLIGRAM(S): at 21:34

## 2023-09-22 RX ADMIN — HEPARIN SODIUM 5000 UNIT(S): 5000 INJECTION INTRAVENOUS; SUBCUTANEOUS at 05:05

## 2023-09-22 RX ADMIN — Medication 650 MILLIGRAM(S): at 14:22

## 2023-09-22 RX ADMIN — Medication 60 MILLIGRAM(S): at 05:04

## 2023-09-22 RX ADMIN — PREGABALIN 1000 MICROGRAM(S): 225 CAPSULE ORAL at 12:01

## 2023-09-22 RX ADMIN — Medication 667 MILLIGRAM(S): at 09:34

## 2023-09-22 NOTE — PROGRESS NOTE ADULT - SUBJECTIVE AND OBJECTIVE BOX
Patient is a 63y old  Female who presents with a chief complaint of hyponatremia (21 Sep 2023 15:07)    HPI:  62 yo F w h/o HTN, HLD, CKD5 presents for evaluation of headache and palpitations. Of note, patient was discharged from [facility] yesterday - patient was admitted for vein mapping for AVF. Since being home patient endorses increased water consumption.    In ED, T 98.6, /78, HR 75, RR 18, SpO2 99% on RA; Labs significant for Na 121.     Patient subsequently admitted for hyponatremia.  (20 Sep 2023 14:00)    PAST MEDICAL & SURGICAL HISTORY:  Hypertension  Hyperlipemia  No significant past surgical history    patient seen and examined independently on morning rounds, chart reviewed and discussed with the medicine resident and on interdisciplinary rounds.    hospital day #2  reports feeling better today- daughter bedside- no chest pain or sob- c/o new onset burning with urination/dysuria- no change in urine output    also daughter provided medical workup including EGD done in [place] 2 weeks ago showing nonerosive gastritis and +HPylori (was not treated at time of diagnosis due to elevated creatinine)    PE:  GEN-NAD, AAOx3, American speaking  PULM- Clear to auscultation bilaterally, fair air entry  CVS- +s1/s2 RRR  GI- soft NT ND +bs, no rebound, no guarding  EXT- no edema  SKIN- no rashes/no lesions  NEURO- nonfocal               Labs:                        10.2   9.77  )-----------( 250      ( 22 Sep 2023 07:21 )             30.3     CBC Full  -  ( 22 Sep 2023 07:21 )  WBC Count : 9.77 K/uL  RBC Count : 3.42 M/uL  Hemoglobin : 10.2 g/dL  Hematocrit : 30.3 %  Platelet Count - Automated : 250 K/uL  Mean Cell Volume : 88.6 fL  Mean Cell Hemoglobin : 29.8 pg  Mean Cell Hemoglobin Concentration : 33.7 g/dL  Auto Neutrophil # : 5.86 K/uL  Auto Lymphocyte # : 2.98 K/uL  Auto Monocyte # : 0.66 K/uL  Auto Eosinophil # : 0.20 K/uL  Auto Basophil # : 0.04 K/uL  Auto Neutrophil % : 60.0 %  Auto Lymphocyte % : 30.5 %  Auto Monocyte % : 6.8 %  Auto Eosinophil % : 2.0 %  Auto Basophil % : 0.4 %      09-22    140  |  101  |  50<H>  ----------------------------<  101<H>  4.5   |  25  |  4.6<HH>    Ca    8.7      22 Sep 2023 07:21  Phos  6.1     09-22  Mg     3.0     09-22    TPro  7.0  /  Alb  4.3  /  TBili  0.2  /  DBili  x   /  AST  162<H>  /  ALT  127<H>  /  AlkPhos  114  09-22      Microbiology:      Vital Signs Last 24 Hrs  T(C): 36.7 (22 Sep 2023 14:46), Max: 36.8 (21 Sep 2023 21:15)  T(F): 98 (22 Sep 2023 14:46), Max: 98.3 (21 Sep 2023 21:15)  HR: 84 (22 Sep 2023 14:46) (72 - 84)  BP: 158/80 (22 Sep 2023 14:46) (128/93 - 158/80)  BP(mean): 107 (22 Sep 2023 04:43) (107 - 107)  RR: 18 (22 Sep 2023 14:46) (18 - 18)  SpO2: 100% (22 Sep 2023 04:43) (100% - 100%)    Parameters below as of 22 Sep 2023 04:43  Patient On (Oxygen Delivery Method): room air        I&O's Summary        MEDICATIONS  (STANDING):  calcium acetate 667 milliGRAM(s) Oral three times a day with meals  chlorhexidine 2% Cloths 1 Application(s) Topical <User Schedule>  cyanocobalamin 1000 MICROGram(s) Oral daily  heparin   Injectable 5000 Unit(s) SubCutaneous every 8 hours  influenza   Vaccine 0.5 milliLiter(s) IntraMuscular once  NIFEdipine XL 60 milliGRAM(s) Oral daily  sodium bicarbonate 650 milliGRAM(s) Oral every 8 hours

## 2023-09-22 NOTE — PROGRESS NOTE ADULT - ASSESSMENT
64 yo F w h/o HTN, HLD, CKD5 presents for evaluation of headache and palpitations 2 days after being discharged from hospital.  Admitted for hyponatremia and worsening renal function to medicine service.      # Hyposmolar Hyponatremia   # CKD 5  - Na on admission 121-- was 133 on discharge day prior  -Na today 140 with fluid restriction  -monitor bmp closely q6hr (avoid over correction)  -continue fluid restriction 1L/day  -nephrology following---may need to reassess need to start RRT however not emergent as patient not-oliguric  -f/u urine studies--new onset dysuria---will repeat UA/UCx today  -elevated TSH 5.34----but on repeat TSH normal 2.4  -T3/T4 both normal   -outpatient vascular f/u for vein mapping (for eventual HD access)    #Transaminitis  -monitor cmp--/  -hold statin  -f/u hepatitis serology  -RUQ US unremarkable    #HTN-stable  -cont nifedipine xl 60 mg daily    #HLD - holding statin 2/2 transaminitis   -check lipid profile    #Anemia of Chronic disease  -stable---hb 10.2  -monitor cbc and check anemia w/u      DVT/GI ppx  guarded prognosis    FULL CODE    Total time spent to complete patient's bedside assessment, review medical chart, discuss medical plan of care with covering medical team was more than 50 minutes  with >50% of time spendt face to face with patient, discussion with patient/family and/or coordination of care    #Progress Note Handoff  Pending (specify):  monitor na, nephrology following-   Family discussion: d/w patient and daughter bedside  Disposition: Home_x_ (anticipate likely dc 24-48 hrs---weekend discharge if labs stabilize and plan for f/u with outpatient w/u

## 2023-09-23 LAB
ALBUMIN SERPL ELPH-MCNC: 4 G/DL — SIGNIFICANT CHANGE UP (ref 3.5–5.2)
ALP SERPL-CCNC: 104 U/L — SIGNIFICANT CHANGE UP (ref 30–115)
ALT FLD-CCNC: 139 U/L — HIGH (ref 0–41)
ANION GAP SERPL CALC-SCNC: 14 MMOL/L — SIGNIFICANT CHANGE UP (ref 7–14)
AST SERPL-CCNC: 152 U/L — HIGH (ref 0–41)
BASOPHILS # BLD AUTO: 0.03 K/UL — SIGNIFICANT CHANGE UP (ref 0–0.2)
BASOPHILS NFR BLD AUTO: 0.4 % — SIGNIFICANT CHANGE UP (ref 0–1)
BILIRUB SERPL-MCNC: <0.2 MG/DL — SIGNIFICANT CHANGE UP (ref 0.2–1.2)
BUN SERPL-MCNC: 52 MG/DL — HIGH (ref 10–20)
CALCIUM SERPL-MCNC: 8.9 MG/DL — SIGNIFICANT CHANGE UP (ref 8.4–10.5)
CHLORIDE SERPL-SCNC: 101 MMOL/L — SIGNIFICANT CHANGE UP (ref 98–110)
CO2 SERPL-SCNC: 23 MMOL/L — SIGNIFICANT CHANGE UP (ref 17–32)
CREAT SERPL-MCNC: 4.4 MG/DL — CRITICAL HIGH (ref 0.7–1.5)
CULTURE RESULTS: SIGNIFICANT CHANGE UP
EGFR: 11 ML/MIN/1.73M2 — LOW
EOSINOPHIL # BLD AUTO: 0.24 K/UL — SIGNIFICANT CHANGE UP (ref 0–0.7)
EOSINOPHIL NFR BLD AUTO: 3.5 % — SIGNIFICANT CHANGE UP (ref 0–8)
GLUCOSE SERPL-MCNC: 97 MG/DL — SIGNIFICANT CHANGE UP (ref 70–99)
HCT VFR BLD CALC: 26.6 % — LOW (ref 37–47)
HGB BLD-MCNC: 9 G/DL — LOW (ref 12–16)
IMM GRANULOCYTES NFR BLD AUTO: 0.3 % — SIGNIFICANT CHANGE UP (ref 0.1–0.3)
LYMPHOCYTES # BLD AUTO: 2.1 K/UL — SIGNIFICANT CHANGE UP (ref 1.2–3.4)
LYMPHOCYTES # BLD AUTO: 30.4 % — SIGNIFICANT CHANGE UP (ref 20.5–51.1)
MCHC RBC-ENTMCNC: 29.9 PG — SIGNIFICANT CHANGE UP (ref 27–31)
MCHC RBC-ENTMCNC: 33.8 G/DL — SIGNIFICANT CHANGE UP (ref 32–37)
MCV RBC AUTO: 88.4 FL — SIGNIFICANT CHANGE UP (ref 81–99)
MONOCYTES # BLD AUTO: 0.53 K/UL — SIGNIFICANT CHANGE UP (ref 0.1–0.6)
MONOCYTES NFR BLD AUTO: 7.7 % — SIGNIFICANT CHANGE UP (ref 1.7–9.3)
NEUTROPHILS # BLD AUTO: 3.98 K/UL — SIGNIFICANT CHANGE UP (ref 1.4–6.5)
NEUTROPHILS NFR BLD AUTO: 57.7 % — SIGNIFICANT CHANGE UP (ref 42.2–75.2)
NRBC # BLD: 0 /100 WBCS — SIGNIFICANT CHANGE UP (ref 0–0)
PLATELET # BLD AUTO: 228 K/UL — SIGNIFICANT CHANGE UP (ref 130–400)
PMV BLD: 10.7 FL — HIGH (ref 7.4–10.4)
POTASSIUM SERPL-MCNC: 5.3 MMOL/L — HIGH (ref 3.5–5)
POTASSIUM SERPL-SCNC: 5.3 MMOL/L — HIGH (ref 3.5–5)
PROT SERPL-MCNC: 6.4 G/DL — SIGNIFICANT CHANGE UP (ref 6–8)
RBC # BLD: 3.01 M/UL — LOW (ref 4.2–5.4)
RBC # FLD: 12 % — SIGNIFICANT CHANGE UP (ref 11.5–14.5)
SODIUM SERPL-SCNC: 138 MMOL/L — SIGNIFICANT CHANGE UP (ref 135–146)
SPECIMEN SOURCE: SIGNIFICANT CHANGE UP
WBC # BLD: 6.9 K/UL — SIGNIFICANT CHANGE UP (ref 4.8–10.8)
WBC # FLD AUTO: 6.9 K/UL — SIGNIFICANT CHANGE UP (ref 4.8–10.8)

## 2023-09-23 PROCEDURE — 99232 SBSQ HOSP IP/OBS MODERATE 35: CPT

## 2023-09-23 RX ORDER — NIFEDIPINE 30 MG
10 TABLET, EXTENDED RELEASE 24 HR ORAL ONCE
Refills: 0 | Status: DISCONTINUED | OUTPATIENT
Start: 2023-09-23 | End: 2023-09-23

## 2023-09-23 RX ORDER — SODIUM ZIRCONIUM CYCLOSILICATE 10 G/10G
10 POWDER, FOR SUSPENSION ORAL EVERY 12 HOURS
Refills: 0 | Status: COMPLETED | OUTPATIENT
Start: 2023-09-23 | End: 2023-09-24

## 2023-09-23 RX ORDER — NIFEDIPINE 30 MG
10 TABLET, EXTENDED RELEASE 24 HR ORAL ONCE
Refills: 0 | Status: COMPLETED | OUTPATIENT
Start: 2023-09-23 | End: 2023-09-23

## 2023-09-23 RX ORDER — SODIUM ZIRCONIUM CYCLOSILICATE 10 G/10G
10 POWDER, FOR SUSPENSION ORAL ONCE
Refills: 0 | Status: DISCONTINUED | OUTPATIENT
Start: 2023-09-23 | End: 2023-09-23

## 2023-09-23 RX ADMIN — Medication 650 MILLIGRAM(S): at 14:00

## 2023-09-23 RX ADMIN — Medication 667 MILLIGRAM(S): at 22:19

## 2023-09-23 RX ADMIN — Medication 60 MILLIGRAM(S): at 05:23

## 2023-09-23 RX ADMIN — Medication 667 MILLIGRAM(S): at 11:43

## 2023-09-23 RX ADMIN — HEPARIN SODIUM 5000 UNIT(S): 5000 INJECTION INTRAVENOUS; SUBCUTANEOUS at 22:20

## 2023-09-23 RX ADMIN — Medication 650 MILLIGRAM(S): at 22:19

## 2023-09-23 RX ADMIN — CHLORHEXIDINE GLUCONATE 1 APPLICATION(S): 213 SOLUTION TOPICAL at 05:21

## 2023-09-23 RX ADMIN — Medication 650 MILLIGRAM(S): at 05:22

## 2023-09-23 RX ADMIN — HEPARIN SODIUM 5000 UNIT(S): 5000 INJECTION INTRAVENOUS; SUBCUTANEOUS at 14:01

## 2023-09-23 RX ADMIN — Medication 667 MILLIGRAM(S): at 17:48

## 2023-09-23 RX ADMIN — SODIUM ZIRCONIUM CYCLOSILICATE 10 GRAM(S): 10 POWDER, FOR SUSPENSION ORAL at 18:32

## 2023-09-23 RX ADMIN — HEPARIN SODIUM 5000 UNIT(S): 5000 INJECTION INTRAVENOUS; SUBCUTANEOUS at 05:21

## 2023-09-23 RX ADMIN — PREGABALIN 1000 MICROGRAM(S): 225 CAPSULE ORAL at 11:43

## 2023-09-23 RX ADMIN — SENNA PLUS 2 TABLET(S): 8.6 TABLET ORAL at 22:18

## 2023-09-23 RX ADMIN — Medication 10 MILLIGRAM(S): at 22:56

## 2023-09-23 NOTE — PROVIDER CONTACT NOTE (OTHER) - SITUATION
Elevated Bp 158/93 notified np treatment ordered Elevated Bp 158/93 notified BP treatment ordered. Will repeat BP

## 2023-09-23 NOTE — PROGRESS NOTE ADULT - SUBJECTIVE AND OBJECTIVE BOX
seen and examined   24 h events noted   no distress      PAST HISTORY  --------------------------------------------------------------------------------  No significant changes to PMH, PSH, FHx, SHx, unless otherwise noted    ALLERGIES & MEDICATIONS  --------------------------------------------------------------------------------  Allergies    No Known Allergies    Intolerances      Standing Inpatient Medications  calcium acetate 667 milliGRAM(s) Oral four times a day with meals  chlorhexidine 2% Cloths 1 Application(s) Topical <User Schedule>  cyanocobalamin 1000 MICROGram(s) Oral daily  heparin   Injectable 5000 Unit(s) SubCutaneous every 8 hours  influenza   Vaccine 0.5 milliLiter(s) IntraMuscular once  NIFEdipine XL 60 milliGRAM(s) Oral daily  sodium bicarbonate 650 milliGRAM(s) Oral every 8 hours  sodium zirconium cyclosilicate 10 Gram(s) Oral once    PRN Inpatient Medications  polyethylene glycol 3350 17 Gram(s) Oral daily PRN  senna 2 Tablet(s) Oral at bedtime PRN        VITALS/PHYSICAL EXAM  --------------------------------------------------------------------------------  T(C): 36.3 (09-23-23 @ 05:05), Max: 36.7 (09-22-23 @ 14:46)  HR: 71 (09-23-23 @ 05:05) (71 - 84)  BP: 149/86 (09-23-23 @ 05:05) (142/82 - 158/80)  RR: 19 (09-23-23 @ 05:05) (18 - 20)  SpO2: --  Wt(kg): --        Physical Exam:  	Gen: NAD  	Pulm: CTA B/L  	CV:  S1S2; no rub  	Abd:  soft, nontender/nondistended  	LE:  no edema  	    LABS/STUDIES  --------------------------------------------------------------------------------              9.0    6.90  >-----------<  228      [09-23-23 @ 05:52]              26.6     138  |  101  |  52  ----------------------------<  97      [09-23-23 @ 05:52]  5.3   |  23  |  4.4        Ca     8.9     [09-23-23 @ 05:52]      Mg     3.0     [09-22-23 @ 07:21]      Phos  6.1     [09-22-23 @ 07:21]    TPro  6.4  /  Alb  4.0  /  TBili  <0.2  /  DBili  x   /  AST  152  /  ALT  139  /  AlkPhos  104  [09-23-23 @ 05:52]    PT/INR: PT 10.20, INR 0.90       [09-22-23 @ 07:21]      Creatinine Trend:  SCr 4.4 [09-23 @ 05:52]  SCr 4.6 [09-22 @ 07:21]  SCr 4.8 [09-21 @ 17:35]  SCr 4.5 [09-21 @ 06:28]  SCr 4.3 [09-20 @ 22:09]    Urinalysis - [09-23-23 @ 05:52]      Color  / Appearance  / SG  / pH       Gluc 97 / Ketone   / Bili  / Urobili        Blood  / Protein  / Leuk Est  / Nitrite       RBC  / WBC  / Hyaline  / Gran  / Sq Epi  / Non Sq Epi  / Bacteria     Urine Protein 56      [09-20-23 @ 12:30]  Urine Sodium 37.0      [09-20-23 @ 12:30]  Urine Urea Nitrogen <6      [09-20-23 @ 12:30]  Urine Potassium 5      [09-20-23 @ 12:30]  Urine Chloride 23      [09-20-23 @ 12:30]  Urine Osmolality 112      [09-20-23 @ 12:30]    Iron 101, TIBC 233, %sat 43      [09-16-23 @ 08:16]  Ferritin 36      [09-16-23 @ 08:16]  PTH -- (Ca 8.5)      [09-20-23 @ 17:24]   174  PTH -- (Ca 8.1)      [09-20-23 @ 11:33]   210  Vitamin D (25OH) 8      [09-20-23 @ 17:24]  TSH 2.40      [09-21-23 @ 17:35]  Lipid: chol 154, TG 70, HDL 80, LDL --      [09-20-23 @ 17:24]    HBsAg Nonreact      [09-22-23 @ 07:21]  HCV 0.05, Nonreact      [09-22-23 @ 07:21]

## 2023-09-23 NOTE — PROGRESS NOTE ADULT - SUBJECTIVE AND OBJECTIVE BOX
Patient is a 63y old  Female who presents with a chief complaint of hyponatremia (21 Sep 2023 15:07)    HPI:  64 yo F w h/o HTN, HLD, CKD5 presents for evaluation of headache and palpitations. Of note, patient was discharged from [facility] yesterday - patient was admitted for vein mapping for AVF. Since being home patient endorses increased water consumption.    In ED, T 98.6, /78, HR 75, RR 18, SpO2 99% on RA; Labs significant for Na 121.     Patient subsequently admitted for hyponatremia.  (20 Sep 2023 14:00)    PAST MEDICAL & SURGICAL HISTORY:  Hypertension  Hyperlipemia  No significant past surgical history    patient seen and examined independently on morning rounds, chart reviewed and discussed with the medicine resident and on interdisciplinary rounds.    hospital day #3  reports feeling better today-  and granddaughter bedside- no chest pain or sob- urine symptoms improved    pcp Dr. Silvestre    PE:  GEN-NAD, AAOx3, Turkish speaking  PULM- Clear to auscultation bilaterally, fair air entry  CVS- +s1/s2 RRR  GI- soft NT ND +bs, no rebound, no guarding  EXT- no edema  SKIN- no rashes/no lesions  NEURO- nonfocal                 Labs:                        9.0    6.90  )-----------( 228      ( 23 Sep 2023 05:52 )             26.6     CBC Full  -  ( 23 Sep 2023 05:52 )  WBC Count : 6.90 K/uL  RBC Count : 3.01 M/uL  Hemoglobin : 9.0 g/dL  Hematocrit : 26.6 %  Platelet Count - Automated : 228 K/uL  Mean Cell Volume : 88.4 fL  Mean Cell Hemoglobin : 29.9 pg  Mean Cell Hemoglobin Concentration : 33.8 g/dL  Auto Neutrophil # : 3.98 K/uL  Auto Lymphocyte # : 2.10 K/uL  Auto Monocyte # : 0.53 K/uL  Auto Eosinophil # : 0.24 K/uL  Auto Basophil # : 0.03 K/uL  Auto Neutrophil % : 57.7 %  Auto Lymphocyte % : 30.4 %  Auto Monocyte % : 7.7 %  Auto Eosinophil % : 3.5 %  Auto Basophil % : 0.4 %      09-23    138  |  101  |  52<H>  ----------------------------<  97  5.3<H>   |  23  |  4.4<HH>    Ca    8.9      23 Sep 2023 05:52  Phos  6.1     09-22  Mg     3.0     09-22    TPro  6.4  /  Alb  4.0  /  TBili  <0.2  /  DBili  x   /  AST  152<H>  /  ALT  139<H>  /  AlkPhos  104  09-23      Microbiology:      Vital Signs Last 24 Hrs  T(C): 36.3 (23 Sep 2023 05:05), Max: 36.7 (22 Sep 2023 14:46)  T(F): 97.4 (23 Sep 2023 05:05), Max: 98.1 (22 Sep 2023 20:41)  HR: 71 (23 Sep 2023 05:05) (71 - 84)  BP: 149/86 (23 Sep 2023 05:05) (142/82 - 158/80)  BP(mean): --  RR: 19 (23 Sep 2023 05:05) (18 - 20)  SpO2: --        I&O's Summary      MEDICATIONS  (STANDING):  calcium acetate 667 milliGRAM(s) Oral three times a day with meals  chlorhexidine 2% Cloths 1 Application(s) Topical <User Schedule>  cyanocobalamin 1000 MICROGram(s) Oral daily  heparin   Injectable 5000 Unit(s) SubCutaneous every 8 hours  influenza   Vaccine 0.5 milliLiter(s) IntraMuscular once  NIFEdipine XL 60 milliGRAM(s) Oral daily  sodium bicarbonate 650 milliGRAM(s) Oral every 8 hours

## 2023-09-23 NOTE — PROGRESS NOTE ADULT - ASSESSMENT
64 yo F w h/o HTN, HLD, CKD5 presents for evaluation of headache and palpitations 2 days after being discharged from hospital.  Admitted for hyponatremia and worsening renal function to medicine service.      # acute on CKD stage 5 with hyperkalemia/hypoosmolar/hyponatremia- polydipsia  - Na on admission 121  -Na stable with 1L fluid restriction/day--Na today 138  -monitor bmp closely  -K up to 5.3 today---will give lokelma 10 g q12 hr and repeat bmp---EKG if K >6--will need kayexalate 15 gm daily on discharge  -continue phoslo and NaHCO3 650 q8hr  -fluid restriction 1L/day  -nephrology following  -continue to reassess need to start RRT however not emergent as no signs of uremia  -dysuria has resolved and UA +protein and trace blood but no pyuria or evidence for infection  -elevated TSH 5.34----but on repeat TSH is normal 2.4  -T3/T4 both normal---can f/u repeat thyroid function as outpatient   -outpatient vascular f/u for vein mapping (for eventual HD access)  -advised to avoid high potassium foods (patient likes to eat fruits and will have RN do renal diet eduction prior to dc)- also continue fluid restriction     #Transaminitis- stable  -monitor cmp--/  -hold statin and hepatotoxic meds  -RUQ US unremarkable  -hep serology negative     #HTN-stable  -cont nifedipine xl 60 mg daily    #HLD - holding statin 2/2 transaminitis       #Anemia of Chronic disease  -stable---hb 9  -monitor cbc    DVT/GI ppx  guarded prognosis    FULL CODE    Total time spent to complete patient's bedside assessment, review medical chart, discuss medical plan of care with covering medical team was more than 35 minutes  with >50% of time spendt face to face with patient, discussion with patient/family and/or coordination of care    #Progress Note Handoff  Pending (specify): Lokelma today q12 hr and f/u bmp for K- will need kayexalate 15 gm daily on dc as well as close f/u with pcp (Dr. Silvestre) and nephrology     Family discussion: d/w patient and family bedside    Disposition: Home_x_ (anticipate possible dc in am)

## 2023-09-23 NOTE — PROGRESS NOTE ADULT - ASSESSMENT
63-year-old female with past medical history of hypertension, hyperlipidemia, CKD5 who was discharged from [facility] yesterday and was at the hospital for JOSUE over CKD stage 5 got readmitted for headache and palpitations.  Patient with no clinical symptoms of uremia or ESKD.     Assessment and plan  CKD stage 5/ Headache/Palpitations/ Moderate hyponatremia polydipsia   - Potassium noted / lokelma 10 q 12  - cr stable  - Blood pressure  controlled  - Anemia of ESRD - last sat elevated / start procrit 5000 s/c weekly  - Hyponatremia - improved  - ph at goal/ on binders  - no acute indication for RRT

## 2023-09-24 LAB
ALBUMIN SERPL ELPH-MCNC: 4.4 G/DL — SIGNIFICANT CHANGE UP (ref 3.5–5.2)
ALP SERPL-CCNC: 115 U/L — SIGNIFICANT CHANGE UP (ref 30–115)
ALT FLD-CCNC: 125 U/L — HIGH (ref 0–41)
ANION GAP SERPL CALC-SCNC: 13 MMOL/L — SIGNIFICANT CHANGE UP (ref 7–14)
ANION GAP SERPL CALC-SCNC: 15 MMOL/L — HIGH (ref 7–14)
AST SERPL-CCNC: 97 U/L — HIGH (ref 0–41)
BASOPHILS # BLD AUTO: 0.05 K/UL — SIGNIFICANT CHANGE UP (ref 0–0.2)
BASOPHILS NFR BLD AUTO: 0.6 % — SIGNIFICANT CHANGE UP (ref 0–1)
BILIRUB SERPL-MCNC: <0.2 MG/DL — SIGNIFICANT CHANGE UP (ref 0.2–1.2)
BUN SERPL-MCNC: 49 MG/DL — HIGH (ref 10–20)
BUN SERPL-MCNC: 53 MG/DL — HIGH (ref 10–20)
CALCIUM SERPL-MCNC: 8.5 MG/DL — SIGNIFICANT CHANGE UP (ref 8.4–10.5)
CALCIUM SERPL-MCNC: 9.4 MG/DL — SIGNIFICANT CHANGE UP (ref 8.4–10.5)
CHLORIDE SERPL-SCNC: 99 MMOL/L — SIGNIFICANT CHANGE UP (ref 98–110)
CHLORIDE SERPL-SCNC: 99 MMOL/L — SIGNIFICANT CHANGE UP (ref 98–110)
CO2 SERPL-SCNC: 22 MMOL/L — SIGNIFICANT CHANGE UP (ref 17–32)
CO2 SERPL-SCNC: 23 MMOL/L — SIGNIFICANT CHANGE UP (ref 17–32)
CREAT SERPL-MCNC: 4.2 MG/DL — CRITICAL HIGH (ref 0.7–1.5)
CREAT SERPL-MCNC: 4.6 MG/DL — CRITICAL HIGH (ref 0.7–1.5)
EGFR: 10 ML/MIN/1.73M2 — LOW
EGFR: 11 ML/MIN/1.73M2 — LOW
EOSINOPHIL # BLD AUTO: 0.25 K/UL — SIGNIFICANT CHANGE UP (ref 0–0.7)
EOSINOPHIL NFR BLD AUTO: 2.8 % — SIGNIFICANT CHANGE UP (ref 0–8)
GLUCOSE SERPL-MCNC: 115 MG/DL — HIGH (ref 70–99)
GLUCOSE SERPL-MCNC: 123 MG/DL — HIGH (ref 70–99)
HCT VFR BLD CALC: 30.1 % — LOW (ref 37–47)
HGB BLD-MCNC: 10 G/DL — LOW (ref 12–16)
IMM GRANULOCYTES NFR BLD AUTO: 0.3 % — SIGNIFICANT CHANGE UP (ref 0.1–0.3)
LYMPHOCYTES # BLD AUTO: 3.07 K/UL — SIGNIFICANT CHANGE UP (ref 1.2–3.4)
LYMPHOCYTES # BLD AUTO: 34 % — SIGNIFICANT CHANGE UP (ref 20.5–51.1)
MCHC RBC-ENTMCNC: 29.6 PG — SIGNIFICANT CHANGE UP (ref 27–31)
MCHC RBC-ENTMCNC: 33.2 G/DL — SIGNIFICANT CHANGE UP (ref 32–37)
MCV RBC AUTO: 89.1 FL — SIGNIFICANT CHANGE UP (ref 81–99)
MONOCYTES # BLD AUTO: 0.43 K/UL — SIGNIFICANT CHANGE UP (ref 0.1–0.6)
MONOCYTES NFR BLD AUTO: 4.8 % — SIGNIFICANT CHANGE UP (ref 1.7–9.3)
NEUTROPHILS # BLD AUTO: 5.21 K/UL — SIGNIFICANT CHANGE UP (ref 1.4–6.5)
NEUTROPHILS NFR BLD AUTO: 57.5 % — SIGNIFICANT CHANGE UP (ref 42.2–75.2)
NRBC # BLD: 0 /100 WBCS — SIGNIFICANT CHANGE UP (ref 0–0)
PLATELET # BLD AUTO: 260 K/UL — SIGNIFICANT CHANGE UP (ref 130–400)
PMV BLD: 10.2 FL — SIGNIFICANT CHANGE UP (ref 7.4–10.4)
POTASSIUM SERPL-MCNC: 4.9 MMOL/L — SIGNIFICANT CHANGE UP (ref 3.5–5)
POTASSIUM SERPL-MCNC: 5.1 MMOL/L — HIGH (ref 3.5–5)
POTASSIUM SERPL-SCNC: 4.9 MMOL/L — SIGNIFICANT CHANGE UP (ref 3.5–5)
POTASSIUM SERPL-SCNC: 5.1 MMOL/L — HIGH (ref 3.5–5)
PROT SERPL-MCNC: 7.2 G/DL — SIGNIFICANT CHANGE UP (ref 6–8)
RBC # BLD: 3.38 M/UL — LOW (ref 4.2–5.4)
RBC # FLD: 12 % — SIGNIFICANT CHANGE UP (ref 11.5–14.5)
SODIUM SERPL-SCNC: 135 MMOL/L — SIGNIFICANT CHANGE UP (ref 135–146)
SODIUM SERPL-SCNC: 136 MMOL/L — SIGNIFICANT CHANGE UP (ref 135–146)
WBC # BLD: 9.04 K/UL — SIGNIFICANT CHANGE UP (ref 4.8–10.8)
WBC # FLD AUTO: 9.04 K/UL — SIGNIFICANT CHANGE UP (ref 4.8–10.8)

## 2023-09-24 PROCEDURE — 99232 SBSQ HOSP IP/OBS MODERATE 35: CPT

## 2023-09-24 PROCEDURE — 93010 ELECTROCARDIOGRAM REPORT: CPT

## 2023-09-24 RX ORDER — NIFEDIPINE 30 MG
10 TABLET, EXTENDED RELEASE 24 HR ORAL ONCE
Refills: 0 | Status: COMPLETED | OUTPATIENT
Start: 2023-09-24 | End: 2023-09-24

## 2023-09-24 RX ORDER — POLYETHYLENE GLYCOL 3350 17 G/17G
17 POWDER, FOR SOLUTION ORAL DAILY
Refills: 0 | Status: DISCONTINUED | OUTPATIENT
Start: 2023-09-24 | End: 2023-09-25

## 2023-09-24 RX ADMIN — POLYETHYLENE GLYCOL 3350 17 GRAM(S): 17 POWDER, FOR SOLUTION ORAL at 05:27

## 2023-09-24 RX ADMIN — Medication 667 MILLIGRAM(S): at 12:19

## 2023-09-24 RX ADMIN — Medication 650 MILLIGRAM(S): at 13:58

## 2023-09-24 RX ADMIN — POLYETHYLENE GLYCOL 3350 17 GRAM(S): 17 POWDER, FOR SOLUTION ORAL at 12:20

## 2023-09-24 RX ADMIN — SODIUM ZIRCONIUM CYCLOSILICATE 10 GRAM(S): 10 POWDER, FOR SUSPENSION ORAL at 05:27

## 2023-09-24 RX ADMIN — PREGABALIN 1000 MICROGRAM(S): 225 CAPSULE ORAL at 12:19

## 2023-09-24 RX ADMIN — Medication 650 MILLIGRAM(S): at 05:28

## 2023-09-24 RX ADMIN — Medication 667 MILLIGRAM(S): at 21:50

## 2023-09-24 RX ADMIN — Medication 650 MILLIGRAM(S): at 21:50

## 2023-09-24 RX ADMIN — Medication 10 MILLIGRAM(S): at 21:50

## 2023-09-24 RX ADMIN — Medication 667 MILLIGRAM(S): at 17:37

## 2023-09-24 RX ADMIN — Medication 60 MILLIGRAM(S): at 05:28

## 2023-09-24 RX ADMIN — CHLORHEXIDINE GLUCONATE 1 APPLICATION(S): 213 SOLUTION TOPICAL at 05:28

## 2023-09-24 RX ADMIN — Medication 667 MILLIGRAM(S): at 08:53

## 2023-09-24 NOTE — PROGRESS NOTE ADULT - ASSESSMENT
62 yo F w h/o HTN, HLD, CKD5 presents for evaluation of headache and palpitations 2 days after being discharged from hospital.  Admitted for hyponatremia and worsening renal function to medicine service.      # acute on CKD stage 5 with hyperkalemia/hypoosmolar/hyponatremia- polydipsia  - Na on admission 121  -Na stable with 1L fluid restriction/day--Na today 136  -monitor bmp closely  -K now is 4.9 today---s/p lokelma 10 g q12 hr  -continue phoslo and NaHCO3 650 q8hr  -fluid restriction 1L/day  -nephrology following  -continue to reassess need to start RRT however not emergent as no signs of uremia  -dysuria has resolved and UA +protein and trace blood but no pyuria or evidence for infection  -elevated TSH 5.34----but on repeat TSH is normal 2.4  -T3/T4 both normal---can f/u repeat thyroid function as outpatient   -outpatient vascular f/u for vein mapping (for eventual HD access)  -advised to avoid high potassium foods (patient likes to eat fruits and will have RN do renal diet eduction prior to dc)- also continue fluid restriction     #Transaminitis- stable  -monitor cmp--/  -hold statin and hepatotoxic meds  -RUQ US unremarkable  -hep serology negative     #HTN, elevated overnight now improved  -cont nifedipine xl 60 mg daily for now  -may need to add additional anti-hypertensive med or increase nifedipine to 90 mg if BP remains elevated     #HLD - holding statin 2/2 transaminitis     #Anemia of Chronic disease  -stable---hb 9  -monitor cbc    DVT/GI ppx  guarded prognosis    FULL CODE    #Progress Note Handoff  Pending (specify): If BP is better and K+ continues to downtrend, can be d/c home    Family discussion: d/w patient and family bedside    Disposition: Home_x_ (anticipate possible dc in am) 64 yo F w h/o HTN, HLD, CKD5 presents for evaluation of headache and palpitations 2 days after being discharged from hospital.  Admitted for hyponatremia and worsening renal function to medicine service.      # acute on CKD stage 5 with hyperkalemia/hypoosmolar/hyponatremia- polydipsia  - Na on admission 121  -Na stable with 1L fluid restriction/day--Na today 136  -monitor bmp closely  -K now is 4.9 today---s/p lokelma 10 g q12 hr  -continue phoslo and NaHCO3 650 q8hr  -fluid restriction 1L/day  -nephrology following  -continue to reassess need to start RRT however not emergent as no signs of uremia  -dysuria has resolved and UA +protein and trace blood but no pyuria or evidence for infection  -elevated TSH 5.34----but on repeat TSH is normal 2.4  -T3/T4 both normal---can f/u repeat thyroid function as outpatient   -outpatient vascular f/u for vein mapping (for eventual HD access)  -advised to avoid high potassium foods (patient likes to eat fruits and will have RN do renal diet eduction prior to dc)- also continue fluid restriction     # Palpitation   - pt reports palpitation in the morning  - will order EKG and ECHO    #Transaminitis- stable  -monitor cmp--/  -hold statin and hepatotoxic meds  -RUQ US unremarkable  -hep serology negative     #HTN, elevated overnight now improved  -cont nifedipine xl 60 mg daily for now  -may need to add additional anti-hypertensive med or increase nifedipine to 90 mg if BP remains elevated     #HLD - holding statin 2/2 transaminitis     #Anemia of Chronic disease  -stable---hb 9  -monitor cbc    DVT/GI ppx  guarded prognosis    FULL CODE    #Progress Note Handoff  Pending (specify): Pending EKG and ECHO for palpitation. If BP is better, K+ continues to downtrend and palpitation resolves, can be d/c home    Family discussion: d/w patient and family bedside    Disposition: Home_x_ (anticipate possible dc in am)

## 2023-09-24 NOTE — PROGRESS NOTE ADULT - SUBJECTIVE AND OBJECTIVE BOX
NARESH MAHMOOD 63y Female  MRN#: [id number]     SUBJECTIVE  Patient is a 63y old Female who presents with a chief complaint of hyponatremia (23 Sep 2023 12:36)  Today is hospital day 4d, and this morning she is lying in bed without distress.   Had hypertension urgency overnight, resolved with addition of nifedipine 10 mg IR.    OBJECTIVE  PAST MEDICAL & SURGICAL HISTORY  Hypertension    Hyperlipemia    No significant past surgical history      ALLERGIES:  No Known Allergies    MEDICATIONS:  STANDING MEDICATIONS  calcium acetate 667 milliGRAM(s) Oral four times a day with meals  chlorhexidine 2% Cloths 1 Application(s) Topical <User Schedule>  cyanocobalamin 1000 MICROGram(s) Oral daily  heparin   Injectable 5000 Unit(s) SubCutaneous every 8 hours  influenza   Vaccine 0.5 milliLiter(s) IntraMuscular once  NIFEdipine XL 60 milliGRAM(s) Oral daily  polyethylene glycol 3350 17 Gram(s) Oral daily  sodium bicarbonate 650 milliGRAM(s) Oral every 8 hours    PRN MEDICATIONS  senna 2 Tablet(s) Oral at bedtime PRN    HOME MEDICATIONS  Home Medications:  NIFEdipine 60 mg oral tablet, extended release: 1 orally once a day (15 Sep 2023 23:21)      VITAL SIGNS: Last 24 Hours  T(C): 35.7 (24 Sep 2023 04:58), Max: 36.8 (23 Sep 2023 19:29)  T(F): 96.2 (24 Sep 2023 04:58), Max: 98.3 (23 Sep 2023 19:29)  HR: 89 (24 Sep 2023 09:17) (70 - 89)  BP: 143/79 (24 Sep 2023 09:17) (139/86 - 204/111)  BP(mean): 104 (24 Sep 2023 09:17) (99 - 131)  RR: 18 (24 Sep 2023 04:58) (18 - 18)  SpO2: 98% (23 Sep 2023 19:29) (98% - 98%)      LABS:                        10.0   9.04  )-----------( 260      ( 24 Sep 2023 07:19 )             30.1     09-24    136  |  99  |  49<H>  ----------------------------<  115<H>  4.9   |  22  |  4.2<HH>    Ca    9.4      24 Sep 2023 07:19    TPro  7.2  /  Alb  4.4  /  TBili  <0.2  /  DBili  x   /  AST  97<H>  /  ALT  125<H>  /  AlkPhos  115  09-24    LIVER FUNCTIONS - ( 24 Sep 2023 07:19 )  Alb: 4.4 g/dL / Pro: 7.2 g/dL / ALK PHOS: 115 U/L / ALT: 125 U/L / AST: 97 U/L / GGT: x             Urinalysis Basic - ( 24 Sep 2023 07:19 )    Color: x / Appearance: x / SG: x / pH: x  Gluc: 115 mg/dL / Ketone: x  / Bili: x / Urobili: x   Blood: x / Protein: x / Nitrite: x   Leuk Esterase: x / RBC: x / WBC x   Sq Epi: x / Non Sq Epi: x / Bacteria: x      Culture - Urine (collected 22 Sep 2023 19:26)  Source: Clean Catch Clean Catch (Midstream)  Final Report (23 Sep 2023 20:01):    <10,000 CFU/mL Normal Urogenital Joselyn      CAPILLARY BLOOD GLUCOSE          RADIOLOGY:    PHYSICAL EXAM:  GENERAL: NAD, AAO x 4, 63y F  HEAD:  Atraumatic, Normocephalic  EYES: EOMI, conjunctivae clear and sclerae white  NECK: Supple, No JVD  CHEST/LUNG: Clear to auscultation bilaterally; No wheeze; No crackles; No accessory muscles used  HEART: Regular rate and rhythm; No murmurs;   ABDOMEN: Soft, Nontender, Nondistended; Bowel sounds present; No guarding  EXTREMITIES:  2+ Peripheral Pulses, No cyanosis or edema  NEUROLOGY: non-focal    ADMISSION SUMMARY  Patient is a 63y old Female who presents with a chief complaint of hyponatremia (23 Sep 2023 12:36)    NARESH MAHMOOD 63y Female  MRN#: [id number]     SUBJECTIVE  Patient is a 63y old Female who presents with a chief complaint of hyponatremia (23 Sep 2023 12:36)  Today is hospital day 4d, and this morning she is lying in bed without distress.   Had hypertension urgency overnight, resolved with addition of nifedipine 10 mg IR.  Pt also reports palpitation.    OBJECTIVE  PAST MEDICAL & SURGICAL HISTORY  Hypertension    Hyperlipemia    No significant past surgical history      ALLERGIES:  No Known Allergies    MEDICATIONS:  STANDING MEDICATIONS  calcium acetate 667 milliGRAM(s) Oral four times a day with meals  chlorhexidine 2% Cloths 1 Application(s) Topical <User Schedule>  cyanocobalamin 1000 MICROGram(s) Oral daily  heparin   Injectable 5000 Unit(s) SubCutaneous every 8 hours  influenza   Vaccine 0.5 milliLiter(s) IntraMuscular once  NIFEdipine XL 60 milliGRAM(s) Oral daily  polyethylene glycol 3350 17 Gram(s) Oral daily  sodium bicarbonate 650 milliGRAM(s) Oral every 8 hours    PRN MEDICATIONS  senna 2 Tablet(s) Oral at bedtime PRN    HOME MEDICATIONS  Home Medications:  NIFEdipine 60 mg oral tablet, extended release: 1 orally once a day (15 Sep 2023 23:21)      VITAL SIGNS: Last 24 Hours  T(C): 35.7 (24 Sep 2023 04:58), Max: 36.8 (23 Sep 2023 19:29)  T(F): 96.2 (24 Sep 2023 04:58), Max: 98.3 (23 Sep 2023 19:29)  HR: 89 (24 Sep 2023 09:17) (70 - 89)  BP: 143/79 (24 Sep 2023 09:17) (139/86 - 204/111)  BP(mean): 104 (24 Sep 2023 09:17) (99 - 131)  RR: 18 (24 Sep 2023 04:58) (18 - 18)  SpO2: 98% (23 Sep 2023 19:29) (98% - 98%)      LABS:                        10.0   9.04  )-----------( 260      ( 24 Sep 2023 07:19 )             30.1     09-24    136  |  99  |  49<H>  ----------------------------<  115<H>  4.9   |  22  |  4.2<HH>    Ca    9.4      24 Sep 2023 07:19    TPro  7.2  /  Alb  4.4  /  TBili  <0.2  /  DBili  x   /  AST  97<H>  /  ALT  125<H>  /  AlkPhos  115  09-24    LIVER FUNCTIONS - ( 24 Sep 2023 07:19 )  Alb: 4.4 g/dL / Pro: 7.2 g/dL / ALK PHOS: 115 U/L / ALT: 125 U/L / AST: 97 U/L / GGT: x             Urinalysis Basic - ( 24 Sep 2023 07:19 )    Color: x / Appearance: x / SG: x / pH: x  Gluc: 115 mg/dL / Ketone: x  / Bili: x / Urobili: x   Blood: x / Protein: x / Nitrite: x   Leuk Esterase: x / RBC: x / WBC x   Sq Epi: x / Non Sq Epi: x / Bacteria: x      Culture - Urine (collected 22 Sep 2023 19:26)  Source: Clean Catch Clean Catch (Midstream)  Final Report (23 Sep 2023 20:01):    <10,000 CFU/mL Normal Urogenital Joselyn      CAPILLARY BLOOD GLUCOSE          RADIOLOGY:    PHYSICAL EXAM:  GENERAL: NAD, AAO x 4, 63y F  HEAD:  Atraumatic, Normocephalic  EYES: EOMI, conjunctivae clear and sclerae white  NECK: Supple, No JVD  CHEST/LUNG: Clear to auscultation bilaterally; No wheeze; No crackles; No accessory muscles used  HEART: Regular rate and rhythm; No murmurs;   ABDOMEN: Soft, Nontender, Nondistended; Bowel sounds present; No guarding  EXTREMITIES:  2+ Peripheral Pulses, No cyanosis or edema  NEUROLOGY: non-focal    ADMISSION SUMMARY  Patient is a 63y old Female who presents with a chief complaint of hyponatremia (23 Sep 2023 12:36)

## 2023-09-25 VITALS
TEMPERATURE: 97 F | SYSTOLIC BLOOD PRESSURE: 152 MMHG | DIASTOLIC BLOOD PRESSURE: 82 MMHG | HEART RATE: 79 BPM | RESPIRATION RATE: 18 BRPM

## 2023-09-25 PROCEDURE — 99239 HOSP IP/OBS DSCHRG MGMT >30: CPT

## 2023-09-25 PROCEDURE — 93306 TTE W/DOPPLER COMPLETE: CPT | Mod: 26

## 2023-09-25 RX ORDER — SIMVASTATIN 20 MG/1
1 TABLET, FILM COATED ORAL
Qty: 30 | Refills: 0
Start: 2023-09-25 | End: 2023-10-24

## 2023-09-25 RX ORDER — NIFEDIPINE 30 MG
1 TABLET, EXTENDED RELEASE 24 HR ORAL
Refills: 0 | DISCHARGE

## 2023-09-25 RX ORDER — NIFEDIPINE 30 MG
1 TABLET, EXTENDED RELEASE 24 HR ORAL
Qty: 30 | Refills: 0
Start: 2023-09-25

## 2023-09-25 RX ORDER — CALCIUM ACETATE 667 MG
3 TABLET ORAL
Qty: 270 | Refills: 0
Start: 2023-09-25 | End: 2023-10-24

## 2023-09-25 RX ORDER — SODIUM ZIRCONIUM CYCLOSILICATE 10 G/10G
10 POWDER, FOR SUSPENSION ORAL
Qty: 86 | Refills: 0
Start: 2023-09-25 | End: 2023-10-24

## 2023-09-25 RX ORDER — SODIUM POLYSTYRENE SULFONATE 4.1 MEQ/G
15 POWDER, FOR SUSPENSION ORAL
Qty: 1 | Refills: 0
Start: 2023-09-25 | End: 2023-10-24

## 2023-09-25 RX ORDER — CALCIUM ACETATE 667 MG
0 TABLET ORAL
Qty: 0 | Refills: 0 | DISCHARGE
Start: 2023-09-25

## 2023-09-25 RX ORDER — CALCIUM ACETATE 667 MG
1 TABLET ORAL
Qty: 90 | Refills: 0
Start: 2023-09-25 | End: 2023-10-24

## 2023-09-25 RX ADMIN — Medication 650 MILLIGRAM(S): at 05:23

## 2023-09-25 RX ADMIN — Medication 667 MILLIGRAM(S): at 11:28

## 2023-09-25 RX ADMIN — Medication 650 MILLIGRAM(S): at 14:02

## 2023-09-25 RX ADMIN — SENNA PLUS 2 TABLET(S): 8.6 TABLET ORAL at 05:24

## 2023-09-25 RX ADMIN — CHLORHEXIDINE GLUCONATE 1 APPLICATION(S): 213 SOLUTION TOPICAL at 05:23

## 2023-09-25 RX ADMIN — Medication 60 MILLIGRAM(S): at 05:22

## 2023-09-25 RX ADMIN — PREGABALIN 1000 MICROGRAM(S): 225 CAPSULE ORAL at 11:28

## 2023-09-25 NOTE — DISCHARGE NOTE PROVIDER - NSDCCPCAREPLAN_GEN_ALL_CORE_FT
PRINCIPAL DISCHARGE DIAGNOSIS  Diagnosis: Hyponatremia  Assessment and Plan of Treatment:       SECONDARY DISCHARGE DIAGNOSES  Diagnosis: Stage 4 chronic kidney disease  Assessment and Plan of Treatment:

## 2023-09-25 NOTE — DISCHARGE NOTE PROVIDER - HOSPITAL COURSE
64 yo F w h/o HTN, HLD, CKD5 presents for evaluation of headache and palpitations. Of note, patient was discharged from [facility] yesterday - patient was admitted for vein mapping for AVF. Since being home patient endorses increased water consumption.    In ED, T 98.6, /78, HR 75, RR 18, SpO2 99% on RA; Labs significant for Na 121.     Patient subsequently admitted for hyponatremia.    Patient was found to have acute on CKD stage 5 with hyperkalemia/hypoosmolar/hyponatremia and polydipsia. Sodium level on admission was 121. She was placed on a 1L fluid restriction a day, with improvement of sodium to 136 (9/24). Patient's hyperkalemia has resolved s/p lokelma 10 g q12 hr, with potassium normalization to 4.9 (9/24). She also receives phoslo and NaHCO3 650 q8hr. Nephrology is also on board and continue to reassess need to start RRT however not emergent as no signs of uremia. Patient's dysuria has resolved and UA +protein and trace blood but no pyuria or evidence for infection. She also presented with an elevated TSH to 5.34 but on repeat TSH is normal 2.4. T3/T4 both normal, so patient can follow up repeat thyroid function as outpatient. Patient should also seek outpatient vascular f/u for vein mapping (for eventual HD access). Patient is advised to avoid high potassium foods (patient likes to eat fruits and will have RN do renal diet eduction prior to dc).    Patient also reports palpitation in the morning. EKG showed normal sinus rhythm. Echo showed Normal global left ventricular systolic function. LV Ejection Fraction by Gonzalez's Method with a biplane EF of 56 %. Normal left ventricular internal cavity size. Spectral Doppler shows impaired relaxation pattern of left ventricular myocardial filling (Grade I diastolic dysfunction). Normal left atrial size. Normal right atrial size. Mild mitral valve regurgitation. Mild thickening of the anterior mitral valve leaflet.     Patient presented with transaminitis, now stable. / (9/21) with resolve. Patient's statin and hepatotoxic meds were held. RUQ US was unremarkable. Hep serology negative.    For patient's HTN, she received nifedipine xl 60 mg daily. For patient's HLD, her statin was held due to transaminitis. For patient's Anemia of Chronic disease, her hemoglobin remained stable at 9.

## 2023-09-25 NOTE — DISCHARGE NOTE NURSING/CASE MANAGEMENT/SOCIAL WORK - NSDCPEFALRISK_GEN_ALL_CORE
For information on Fall & Injury Prevention, visit: [email or web address] OR  [email or web address] OR  https://www.cdc.gov/steadi/patient.html

## 2023-09-25 NOTE — DISCHARGE NOTE PROVIDER - PROVIDER TOKENS
PROVIDER:[TOKEN:[[id number]],FOLLOWUP:[1 week],ESTABLISHEDPATIENT:[T]],PROVIDER:[TOKEN:[[id number]],FOLLOWUP:[1 week],ESTABLISHEDPATIENT:[T]] PROVIDER:[TOKEN:[[id number]],FOLLOWUP:[1 week],ESTABLISHEDPATIENT:[T]],PROVIDER:[TOKEN:[[id number]],FOLLOWUP:[1 week],ESTABLISHEDPATIENT:[T]],PROVIDER:[TOKEN:[[id number]]]

## 2023-09-25 NOTE — DISCHARGE NOTE PROVIDER - NSDCMRMEDTOKEN_GEN_ALL_CORE_FT
cyanocobalamin 1000 mcg oral tablet: 1 tab(s) orally once a day  NIFEdipine 60 mg oral tablet, extended release: 1 orally once a day  simvastatin 10 mg oral tablet: 1 tab(s) orally once a day (at bedtime)  sodium bicarbonate 650 mg oral tablet: 1 tab(s) orally every 8 hours  sodium polystyrene sulfonate oral and rectal powder: 15 gram(s) orally 2 times a day   calcium acetate 667 mg oral tablet: 3 tab(s) orally 3 times a day (before meals)  cyanocobalamin 1000 mcg oral tablet: 1 tab(s) orally once a day  Lokelma 10 g oral powder for reconstitution: 10 gram(s) orally 2 times a week  NIFEdipine (Eqv-Procardia XL) 90 mg oral tablet, extended release: 1 tab(s) orally once a day  simvastatin 10 mg oral tablet: 1 tab(s) orally once a day (at bedtime) to start in 1 week on 10/4/23  sodium bicarbonate 650 mg oral tablet: 1 tab(s) orally every 8 hours  sodium polystyrene sulfonate oral and rectal powder: 15 gram(s) orally 2 times a week Take only if you cannot get lokelma

## 2023-09-25 NOTE — DISCHARGE NOTE PROVIDER - NSDCFUADDINST_GEN_ALL_CORE_FT
Please repeat thyroid function as outpatient with primary care doctor. Please follow up with Dr. Darden for vein mapping and eventual hemodialysis access.

## 2023-09-25 NOTE — PROGRESS NOTE ADULT - ASSESSMENT
64 yo F w h/o HTN, HLD, CKD5 presents for evaluation of headache and palpitations 2 days after being discharged from hospital.  Admitted for hyponatremia and worsening renal function to medicine service.      # acute on CKD stage 5 with hyperkalemia/hypoosmolar/hyponatremia- polydipsia  - Na on admission 121  -Na stable with 1L fluid restriction/day--Na  136 on 9/24  -  -K now is 4.9 ---s/p lokelma 10 g q12 hr  -continue phoslo and NaHCO3 650 q8hr  -fluid restriction 1L/day  -nephrology following  -continue to reassess need to start RRT however not emergent as no signs of uremia  -dysuria has resolved and UA +protein and trace blood but no pyuria or evidence for infection  -elevated TSH 5.34----but on repeat TSH is normal 2.4  -T3/T4 both normal---can f/u repeat thyroid function as outpatient   -outpatient vascular f/u for vein mapping (for eventual HD access)  -advised to avoid high potassium foods      # Palpitation   - pt reports palpitation in the morning  -  EKG with NSR and ECHO< from: TTE Echo Complete w/o Contrast w/ Doppler (09.25.23 @ 08:09) >  Normal global left ventricular systolic function.   2. LV Ejection Fraction by Gonzalez's Method with a biplane EF of 56 %.   3. Normal left ventricular internal cavity size.   4. Spectral Doppler shows impaired relaxation pattern of left   ventricular myocardial filling (Grade I diastolic dysfunction).   5. Normal left atrial size.   6. Normal right atrial size.   7. Mild mitral valve regurgitation.   8. Mild thickening of the anterior mitral valve leaflet.        #Transaminitis- stable  -monitor cmp--/  -hold statin and hepatotoxic meds  -RUQ US unremarkable  -hep serology negative     #HTN, elevated overnight   -- increase nifedipine to 90 mg      #HLD - holding statin 2/2 transaminitis     #Anemia of Chronic disease  -stable---hb 10.0        FULL CODE    DC plan today-- spent more than 30mins

## 2023-09-25 NOTE — DISCHARGE NOTE PROVIDER - CARE PROVIDER_API CALL
Rekha Silvestre  Pulmonary Disease  [address] [place] [id number]  Phone: [phone]  Fax: [phone]  Established Patient  Follow Up Time: 1 week    Mookie Darden  Interventional Radiology and Diagnostic Radiology  [address] [place] [address]  Phone: [phone]  Fax: [phone]  Established Patient  Follow Up Time: 1 week   Rekha Silvestre  Pulmonary Disease  [address] [place] [id number]  Phone: ([phone]  Fax: ([phone]  Established Patient  Follow Up Time: 1 week    Mookie Darden  Interventional Radiology and Diagnostic Radiology  [address] [place] [address]  Phone: [phone]  Fax: [phone]  Established Patient  Follow Up Time: 1 week    Santos Dale  Nephrology  [address] [place] [address]  Phone: [phone]  Fax: [phone]  Follow Up Time:

## 2023-09-25 NOTE — DISCHARGE NOTE NURSING/CASE MANAGEMENT/SOCIAL WORK - PATIENT PORTAL LINK FT
You can access the FollowMyHealth Patient Portal offered by [facility] by registering at the following website: [email or web address]. By joining [employer]’s FollowMyHealth portal, you will also be able to view your health information using other applications (apps) compatible with our system.

## 2023-09-25 NOTE — DISCHARGE NOTE PROVIDER - CARE PROVIDERS DIRECT ADDRESSES
,DirectAddress_Unknown,DirectAddress_Unknown ,DirectAddress_Unknown,DirectAddress_Unknown,[email or web address]

## 2023-09-25 NOTE — PROGRESS NOTE ADULT - SUBJECTIVE AND OBJECTIVE BOX
SUBJECTIVE:    Patient is a 63y old Female who presents with a chief complaint of hyponatremia (25 Sep 2023 10:07)    Currently admitted to medicine with the primary diagnosis of Hyponatremia       Today is hospital day 5d.     PAST MEDICAL & SURGICAL HISTORY  Hypertension    Hyperlipemia    No significant past surgical history      ALLERGIES:  No Known Allergies    MEDICATIONS:  STANDING MEDICATIONS  calcium acetate 667 milliGRAM(s) Oral four times a day with meals  chlorhexidine 2% Cloths 1 Application(s) Topical <User Schedule>  cyanocobalamin 1000 MICROGram(s) Oral daily  heparin   Injectable 5000 Unit(s) SubCutaneous every 8 hours  influenza   Vaccine 0.5 milliLiter(s) IntraMuscular once  NIFEdipine XL 60 milliGRAM(s) Oral daily  polyethylene glycol 3350 17 Gram(s) Oral daily  sodium bicarbonate 650 milliGRAM(s) Oral every 8 hours    PRN MEDICATIONS  senna 2 Tablet(s) Oral at bedtime PRN    VITALS:   T(F): 96.1  HR: 74  BP: 168/84  RR: 18  SpO2: --    LABS:                        10.0   9.04  )-----------( 260      ( 24 Sep 2023 07:19 )             30.1     09-24    136  |  99  |  49<H>  ----------------------------<  115<H>  4.9   |  22  |  4.2<HH>    Ca    9.4      24 Sep 2023 07:19    TPro  7.2  /  Alb  4.4  /  TBili  <0.2  /  DBili  x   /  AST  97<H>  /  ALT  125<H>  /  AlkPhos  115  09-24      Urinalysis Basic - ( 24 Sep 2023 07:19 )    Color: x / Appearance: x / SG: x / pH: x  Gluc: 115 mg/dL / Ketone: x  / Bili: x / Urobili: x   Blood: x / Protein: x / Nitrite: x   Leuk Esterase: x / RBC: x / WBC x   Sq Epi: x / Non Sq Epi: x / Bacteria: x            Culture - Urine (collected 22 Sep 2023 19:26)  Source: Clean Catch Clean Catch (Midstream)  Final Report (23 Sep 2023 20:01):    <10,000 CFU/mL Normal Urogenital Joselyn          RADIOLOGY:    PHYSICAL EXAM:  GEN: No acute distress  LUNGS: Clear to auscultation bilaterally   HEART: S1/S2 present. RRR.   ABD/ GI: Soft, non-tender, non-distended. Bowel sounds present  EXT: NC/NC/NE/2+PP/URBINA  NEURO: AAOX3

## 2023-10-03 DIAGNOSIS — I12.0 HYPERTENSIVE CHRONIC KIDNEY DISEASE WITH STAGE 5 CHRONIC KIDNEY DISEASE OR END STAGE RENAL DISEASE: ICD-10-CM

## 2023-10-03 DIAGNOSIS — R74.01 ELEVATION OF LEVELS OF LIVER TRANSAMINASE LEVELS: ICD-10-CM

## 2023-10-03 DIAGNOSIS — Z28.21 IMMUNIZATION NOT CARRIED OUT BECAUSE OF PATIENT REFUSAL: ICD-10-CM

## 2023-10-03 DIAGNOSIS — R63.1 POLYDIPSIA: ICD-10-CM

## 2023-10-03 DIAGNOSIS — E87.1 HYPO-OSMOLALITY AND HYPONATREMIA: ICD-10-CM

## 2023-10-03 DIAGNOSIS — Z79.899 OTHER LONG TERM (CURRENT) DRUG THERAPY: ICD-10-CM

## 2023-10-03 DIAGNOSIS — E87.5 HYPERKALEMIA: ICD-10-CM

## 2023-10-03 DIAGNOSIS — E83.42 HYPOMAGNESEMIA: ICD-10-CM

## 2023-10-03 DIAGNOSIS — E78.5 HYPERLIPIDEMIA, UNSPECIFIED: ICD-10-CM

## 2023-10-03 DIAGNOSIS — R00.2 PALPITATIONS: ICD-10-CM

## 2023-10-03 DIAGNOSIS — I34.0 NONRHEUMATIC MITRAL (VALVE) INSUFFICIENCY: ICD-10-CM

## 2023-10-03 DIAGNOSIS — N18.5 CHRONIC KIDNEY DISEASE, STAGE 5: ICD-10-CM

## 2023-10-03 DIAGNOSIS — D63.1 ANEMIA IN CHRONIC KIDNEY DISEASE: ICD-10-CM

## 2023-10-03 DIAGNOSIS — N17.9 ACUTE KIDNEY FAILURE, UNSPECIFIED: ICD-10-CM

## 2024-01-15 NOTE — CHART NOTE - NSCHARTNOTEFT_GEN_A_CORE
Called by [facility], patient's K level is 6.1  Called patient to discuss going to the ED to evaluate hyperkalemia  patient says she ran out of lokelma a couple of days ago and has not been taking any, as well as having high K intake  patient does not want to go to the ED.  She understands that she might have life threatening arrhythmia d/t hyperkalemia  sent new prescription to [facility] for lokelma and d/w pt to take 10g twice daily until she follows up with her nephrologist (Dr. Eric Clayton) on Thursday, as well as to avoid high K containing foods

## 2024-02-19 NOTE — PATIENT PROFILE ADULT - BILL PAYMENT
Problem: Adult Inpatient Plan of Care  Goal: Plan of Care Review  Outcome: Ongoing, Progressing  Goal: Patient-Specific Goal (Individualized)  Outcome: Ongoing, Progressing  Goal: Absence of Hospital-Acquired Illness or Injury  Outcome: Ongoing, Progressing  Goal: Optimal Comfort and Wellbeing  Outcome: Ongoing, Progressing  Goal: Readiness for Transition of Care  Outcome: Ongoing, Progressing     Problem: Fall Injury Risk  Goal: Absence of Fall and Fall-Related Injury  Outcome: Ongoing, Progressing     Problem: Chest Pain  Goal: Resolution of Chest Pain Symptoms  Outcome: Ongoing, Progressing      no
